# Patient Record
Sex: MALE | Race: WHITE | NOT HISPANIC OR LATINO | Employment: FULL TIME | URBAN - METROPOLITAN AREA
[De-identification: names, ages, dates, MRNs, and addresses within clinical notes are randomized per-mention and may not be internally consistent; named-entity substitution may affect disease eponyms.]

---

## 2017-01-02 ENCOUNTER — OFFICE VISIT (OUTPATIENT)
Dept: DERMATOLOGY | Facility: CLINIC | Age: 47
End: 2017-01-02
Payer: COMMERCIAL

## 2017-01-02 DIAGNOSIS — B35.1 ONYCHOMYCOSIS: Primary | ICD-10-CM

## 2017-01-02 DIAGNOSIS — L57.0 AK (ACTINIC KERATOSIS): ICD-10-CM

## 2017-01-02 DIAGNOSIS — Z85.820 HISTORY OF MELANOMA: ICD-10-CM

## 2017-01-02 LAB
ALBUMIN SERPL-MCNC: 4 G/DL (ref 3.4–5)
ALP SERPL-CCNC: 43 U/L (ref 40–150)
ALT SERPL W P-5'-P-CCNC: 29 U/L (ref 0–70)
AST SERPL W P-5'-P-CCNC: 16 U/L (ref 0–45)
BILIRUB DIRECT SERPL-MCNC: 0.1 MG/DL (ref 0–0.2)
BILIRUB SERPL-MCNC: 0.5 MG/DL (ref 0.2–1.3)
PROT SERPL-MCNC: 7 G/DL (ref 6.8–8.8)

## 2017-01-02 PROCEDURE — 99214 OFFICE O/P EST MOD 30 MIN: CPT | Mod: 25 | Performed by: DERMATOLOGY

## 2017-01-02 PROCEDURE — 36415 COLL VENOUS BLD VENIPUNCTURE: CPT | Performed by: DERMATOLOGY

## 2017-01-02 PROCEDURE — 80076 HEPATIC FUNCTION PANEL: CPT | Performed by: DERMATOLOGY

## 2017-01-02 PROCEDURE — 17000 DESTRUCT PREMALG LESION: CPT | Performed by: DERMATOLOGY

## 2017-01-02 RX ORDER — TERBINAFINE HYDROCHLORIDE 250 MG/1
250 TABLET ORAL DAILY
Qty: 30 TABLET | Refills: 2 | Status: SHIPPED | OUTPATIENT
Start: 2017-01-02 | End: 2017-04-02

## 2017-01-02 NOTE — MR AVS SNAPSHOT
After Visit Summary   1/2/2017    Wilner Clement    MRN: [id number]           Patient Information     Date Of Birth          1970        Visit Information        Provider Department      1/2/2017 2:00 PM Jessie Bower MD [facility]        Today's Diagnoses     Onychomycosis    -  1       Care Instructions    Cryotherapy    What is it?    Use of a very cold liquid, such as liquid nitrogen, to freeze and destroy abnormal skin cells that need to be removed    What should I expect?    Tenderness and redness    A small blister that might grow and fill with dark purple blood. There may be crusting.    More than one treatment may be needed if the lesions do not go away.    How do I care for the treated area?    Gently wash the area with your hands when bathing.    Use a thin layer of Vaseline to help with healing. You may use a Band-Aid.     The area should heal within 7-10 days and may leave behind a pink or lighter color.     Do not use an antibiotic or Neosporin ointment.     You may take acetaminophen (Tylenol) for pain.     Call your Doctor if you have:    Severe pain    Signs of infection (warmth, redness, cloudy yellow drainage, and or a bad smell)    Questions or concerns    Who should I call with questions?       [facility]: [phone]       [facility]: [phone]       For urgent needs outside of business hours call the [facility] at [phone]        and ask for the dermatology resident on call        Follow-ups after your visit        Your next 10 appointments already scheduled     Feb 15, 2017  4:00 PM   LAB with LAB FIRST FLOOR [facility] ([facility])    [address] [place] [id number] [phone]           Patient must bring picture ID.  Patient should be prepared to give a urine specimen  Please do not eat 10-12 hours before  your appointment if you are coming in fasting for labs on lipids, cholesterol, or glucose (sugar).  Pregnant women should follow their Care Team instructions. Water with medications is okay. Do not drink coffee or other fluids.   If you have concerns about taking  your medications, please ask at office or if scheduling via PaxVax, send a message by clicking on Secure Messaging, Message Your Care Team.              Future tests that were ordered for you today     Open Standing Orders        Priority Remaining Interval Expires Ordered    Hepatic panel Routine 2/2 2/13/2018 1/2/2017            Who to contact     If you have questions or need follow up information about today's clinic visit or your schedule please contact [facility] directly at [phone].  Normal or non-critical lab and imaging results will be communicated to you by Airuhart, letter or phone within 4 business days after the clinic has received the results. If you do not hear from us within 7 days, please contact the clinic through [email or web address] or phone. If you have a critical or abnormal lab result, we will notify you by phone as soon as possible.  Submit refill requests through PaxVax or call your pharmacy and they will forward the refill request to us. Please allow 3 business days for your refill to be completed.          Additional Information About Your Visit        PaxVax Information     PaxVax gives you secure access to your electronic health record. If you see a primary care provider, you can also send messages to your care team and make appointments. If you have questions, please call your primary care clinic.  If you do not have a primary care provider, please call [phone] and they will assist you.      PaxVax is an electronic gateway that provides easy, online access to your medical records. With PaxVax, you can request a clinic appointment, read your test results, renew a prescription or communicate with your care  team.     To access your existing account, please contact your [facility] or call [phone] for assistance.         Blood Pressure from Last 3 Encounters:   09/29/15 128/92   10/06/14 118/78   06/16/14 112/82    Weight from Last 3 Encounters:   09/29/15 113.399 kg (250 lb)   10/06/14 106.323 kg (234 lb 6.4 oz)   06/16/14 102.967 kg (227 lb)                 Today's Medication Changes          These changes are accurate as of: 1/2/17  2:30 PM.  If you have any questions, ask your nurse or doctor.               Start taking these medicines.        Dose/Directions    terbinafine 250 MG tablet   Commonly known as:  lamISIL   Used for:  Onychomycosis   Started by:  Jessie Bower MD        Dose:  250 mg   Take 1 tablet (250 mg) by mouth daily   Quantity:  30 tablet   Refills:  2            Where to get your medicines      These medications were sent to [place] [address] [facility] [place] - [address]  [address] [facility] [address]     Phone:  [phone]    - terbinafine 250 MG tablet             Primary Care Provider Office Phone # Fax #    [facility] [phone]       [facility] [address] [facility] [address]        Thank you!     Thank you for choosing [facility]  for your care. Our goal is always to provide you with excellent care. Hearing back from our patients is one way we can continue to improve our services. Please take a few minutes to complete the written survey that you may receive in the mail after your visit with us. Thank you!             Your Updated Medication List - Protect others around you: Learn how to safely use, store and throw away your medicines at www.disposemymeds.org.          This list is accurate as of: 1/2/17  2:30 PM.  Always use your most recent med list.                   Brand Name Dispense Instructions for use    terbinafine 250 MG tablet    lamISIL    30 tablet     Take 1 tablet (250 mg) by mouth daily

## 2017-01-02 NOTE — Clinical Note
Patient:  Wilner Clement  :   1970  MRN:     [id number]        Mr.Thomas BETH Clement  [address] [place] [address]        2017    Dear ,    We are writing to inform you of your test results that show normal liver function, it is ok for you to take the terbinafine medication we discussed. If you have further questions or concerns, please contact the clinic at [phone].      Sincerely,    Jessie Bower MD    Dermatology Department of Dermatology  [facility]    Resulted Orders   Hepatic panel   Result Value Ref Range    Bilirubin Direct 0.1 0.0 - 0.2 mg/dL    Bilirubin Total 0.5 0.2 - 1.3 mg/dL    Albumin 4.0 3.4 - 5.0 g/dL    Protein Total 7.0 6.8 - 8.8 g/dL    Alkaline Phosphatase 43 40 - 150 U/L    ALT 29 0 - 70 U/L    AST 16 0 - 45 U/L

## 2017-01-02 NOTE — NURSING NOTE
Dermatology Rooming Note    Wilner Clement's goals for this visit include:   Chief Complaint   Patient presents with     Derm Problem     yrly skin check area of concern on left forearm       Is a scribe okay for this visit:Not applicable,     Are records needed for this visit(If yes, obtain release of information): No,      Vitals: There were no vitals taken for this visit.    Referring Provider:  ESTABLISHED PATIENT  No address on file    Bev Grullon LPN

## 2017-01-02 NOTE — PATIENT INSTRUCTIONS
Cryotherapy    What is it?    Use of a very cold liquid, such as liquid nitrogen, to freeze and destroy abnormal skin cells that need to be removed    What should I expect?    Tenderness and redness    A small blister that might grow and fill with dark purple blood. There may be crusting.    More than one treatment may be needed if the lesions do not go away.    How do I care for the treated area?    Gently wash the area with your hands when bathing.    Use a thin layer of Vaseline to help with healing. You may use a Band-Aid.     The area should heal within 7-10 days and may leave behind a pink or lighter color.     Do not use an antibiotic or Neosporin ointment.     You may take acetaminophen (Tylenol) for pain.     Call your Doctor if you have:    Severe pain    Signs of infection (warmth, redness, cloudy yellow drainage, and or a bad smell)    Questions or concerns    Who should I call with questions?       [facility]: [phone]       [facility]: [phone]       For urgent needs outside of business hours call the [facility] at [phone]        and ask for the dermatology resident on call

## 2017-01-02 NOTE — PROGRESS NOTES
Derm Prob List:  1. Melanoma: Melanoma in situ located on the Left Shoulder  s/p Excision on 2010  2. CNH, R ear  3. Dysplastic nevus (mild), L upper back    CHIEF COMPLAINT:   Chief Complaint   Patient presents with     Derm Problem     yrly skin check area of concern on left forearm       SUBJECTIVE: Wilner Clement a 44 year old male with a personal history of Melanoma in situ located on the Left Shoulder  s/p Excision on 2010 here for follow up skin exam.  They were last seen in our clinic 9/24/15. Today he has no concerns over growing, tender, bleeding, or other lesions. He is otherwise well and without other skin concerns.    PAST MEDICAL HISTORY:   Allergies as of    Diagnosis     Family history of diabetes mellitus     Family history of ischemic heart disease     Neoplasm of uncertain behavior of skin     CARDIOVASCULAR SCREENING; LDL GOAL LESS THAN 160     History of melanoma in situ     Family history of colon cancer     Cherry angioma     Multiple nevi     FAMILY HISTORY: No family history of non-melanoma skin cancer or melanoma.    SUN SCREEN USE: yes  TANNING BED USE: never  REVIEW OF SYSTEMS: No fevers, chills, night sweats or unexplained weight loss.  The patient is otherwise feeling well and has no other concerns.  MEDICATIONS:    No current outpatient prescriptions on file.     ALLERGIES:    Allergies as of 01/02/2017 - Review Complete 01/02/2017   Allergen Reaction Noted     No known drug allergies  09/27/2010     OBJECTIVE:   Heathy appearing 45 year old male, awake, alert and oriented, in no acute distress.   A full skin examination was performed including the face, scalp, ears, eyelids, lips, neck, chest, back, abdomen, hands, feet, upper and lower extremities and buttocks was performed and findings are as follows:  - gritty red papule on L cheek  - maceration of toe webls bilaterally and thickened discolored toenails with subungual debris  - cherry red papule to the top of the vertex scalp,  trunk, bilateral legs.  - Scattered brown macules on sun exposed areas and right forehead - 1cm.  - Scattered, small (<4mm), monomorphic, brown, symmetric macules and papules distributed over the trunk and extremities.    - Dermatofibroma- Firm slightly hyperpigmented nodule that invaginates with pinching 0.5 cm in size is located on the posterior left lower leg.  - Site of previous skin cancer is examined and no evidence of recurrence is noted by inspection or palpation.   - Lymph Nodes - Examination of the pre and post auricular, occipital, submental, cervical, clavicular, axillary and inguinal lymph nodes was performed with no palpable adenopathy    ASSESSMENT AND PLAN:     onychomycosis and tinea  Pedis - terbinafine x 3 mo    1 -melanoma in situ: no evidence of recurrence   - reviewed ABCDEs of melanoma   - Sun precaution was advised including the use of sun screens of SPF 30 or higher, with reapplication every 2 hours. Use hats and other physical protective barriers when possible.  2 - Benign nevi: no lesions of concern on dermoscopy, continue to review clinically  3 - Solar lentiginosis: Sun protection with SPF 30 or higher recommended  4 - tinea pedix and onychomycosis:  Start terbinafine 250 mg daily x12 weeks for nails. Patient counseled that nails may take approximately 12 to 18  monthsto completely grow out.   Repeat course of Lamisil may be necessary.The patient was counseled on the rare but serious risk of liver disease. Baseline alt and ast will be obtained today and repeated in 6 weeks.     5 - AK: 1 lesion treated today by cryotherapy, pt given post cryo wound care instructions  The patient will follow up 1 year, sooner if new or changing lesions.  Jessie Bower MD    Department of Dermatology  [facility]

## 2018-01-04 ENCOUNTER — OFFICE VISIT (OUTPATIENT)
Dept: DERMATOLOGY | Facility: CLINIC | Age: 48
End: 2018-01-04
Payer: COMMERCIAL

## 2018-01-04 DIAGNOSIS — D22.9 MULTIPLE BENIGN NEVI: ICD-10-CM

## 2018-01-04 DIAGNOSIS — D18.01 CHERRY ANGIOMA: ICD-10-CM

## 2018-01-04 DIAGNOSIS — D48.5 NEOPLASM OF UNCERTAIN BEHAVIOR OF SKIN: Primary | ICD-10-CM

## 2018-01-04 DIAGNOSIS — Z86.006 HISTORY OF MELANOMA IN SITU: ICD-10-CM

## 2018-01-04 PROCEDURE — 99213 OFFICE O/P EST LOW 20 MIN: CPT | Mod: 25 | Performed by: DERMATOLOGY

## 2018-01-04 PROCEDURE — 11100 HC BIOPSY SKIN/SUBQ/MUC MEM, SINGLE LESION: CPT | Performed by: DERMATOLOGY

## 2018-01-04 PROCEDURE — 88305 TISSUE EXAM BY PATHOLOGIST: CPT | Performed by: DERMATOLOGY

## 2018-01-04 PROCEDURE — 11101 HC BIOPSY SKIN/SUBQ/MUC MEM, EACH ADDTL LESION: CPT | Performed by: DERMATOLOGY

## 2018-01-04 NOTE — LETTER
1/4/2018       RE: Wilner Clement  [address] [place] [address]     Dear Colleague,    Thank you for referring your patient, Wilner Clement, to the [facility]. Please see a copy of my visit note below.    [facility] Dermatology Note    Dermatology Problem List:  1. Melanoma: Melanoma in situ located on the Left Shoulder  s/p Excision on 2010  2. CNH, R ear  3. Dysplastic nevus (mild), L upper back  4. NUB x 2  - R deltoid, r/o dysplastic nevus vs melanoma biopsied 1/4/18 - results pending  - R upper back, r/o dysplastic nevus vs melanoma biopsied 1/4/18 - results pending    Encounter Date: Jan 4, 2018    CC:   Chief Complaint   Patient presents with     Derm Problem     full skin check H/O Melanoma in situ- left shoulder   LOV skin check Dr. Hoff 1/02/17     History of Present Illness:  Mr. Wilner Clement is a 47 year old male who presents as a follow-up for skin check. The patient was last seen 1/2/17 for his last skin check, he had only one AK at that time treated with LN2. The patient has no particular skin concerns of his own today.     Does currently have a cold sore that is healing. They occur regularly, but infrequently. He used to take PO antivirals.     Otherwise well. He does wear a wide brim hat when outdoors and tries to remember to wear sunscreen.    Past Medical History:   Patient Active Problem List   Diagnosis     Family history of diabetes mellitus     Family history of ischemic heart disease     Neoplasm of uncertain behavior of skin     CARDIOVASCULAR SCREENING; LDL GOAL LESS THAN 160     History of melanoma in situ     Family history of colon cancer     Cherry angioma     Multiple nevi     Past Medical History:   Diagnosis Date     Cancer (H)     Skin     Cancer (H)     melanoma- back     Lumbago 2003     MEDICAL HISTORY OF - 2003    left knee pain     MEDICAL HISTORY OF - 2005,2014    colonoscopy- due  2019     Past Surgical History:   Procedure Laterality Date     BIOPSY  2011    Skin biopsy/mole on back removed.     BIOPSY      Skin cancer     COLONOSCOPY       COLONOSCOPY  2/24/2014    Procedure: COLONOSCOPY;  Colonoscopy, screening;  Surgeon: Alanis Piña MD;  Location: [facility] OR     NO HISTORY OF SURGERY         Social History:  The patient denies use of tanning beds.    Family History:  There is no family history of skin cancer.    Medications:  No current outpatient prescriptions on file.        Allergies   Allergen Reactions     No Known Drug Allergies        Review of Systems:  -Constitutional: The patient denies fatigue, fevers, chills, unintended weight loss, and night sweats.  -Skin: As above in HPI. No additional skin concerns.    Physical exam:  Vitals: There were no vitals taken for this visit.  GEN: This is a well developed, well-nourished male in no acute distress, in a pleasant mood.    SKIN: A full skin examination was performed including the face, scalp, ears, eyelids, lips, neck, chest, back, abdomen, hands, feet, upper and lower extremities and buttocks was performed and findings are as follows:    - cherry red vascular papules to the top on the trunk and extremities  - Scattered, small (<4mm), monomorphic, brown, symmetric macules and papules distributed over the trunk and extremities.    - Firm medium brown hyperpigmented nodule that invaginates with pinching on the right anterior thigh.  - Site of previous skin cancer is examined and no evidence of recurrence is noted by inspection or palpation.   - Right deltoid and right upper back both with small ~2-3mm dark brown macules, under dermoscopy both lesions have a dark central homogenous region that disperse to a more reticular network peripherally  - Lymph Nodes - Examination of the pre and post auricular, occipital, submental, cervical, clavicular, axillary lymph nodes was performed with no palpable  adenopathy    Impression/Plan:  1. Neoplasm of uncertain behavior on the right deltoid. The differential diagnosis includes dysplastic nevus vs melanoma.     Shave biopsy:  After discussion of benefits and risks including but not limited to bleeding/bruising, pain/swelling, infection, scar, incomplete removal, nerve damage/numbness, recurrence, and non-diagnostic biopsy, written consent, verbal consent and photographs were obtained. Time-out was performed. The area was cleaned with isopropyl alcohol.  was injected to obtain adequate anesthesia of the lesion on the right deltoid. 1ml of 1% lidocaine with 1:100,000 epinephrine was injected to obtain adequate anesthesia. A  shave biopsy was performed. Hemostasis was achieved with aluminium chloride. Vaseline and a sterile dressing were applied. The patient tolerated the procedure and no complications were noted. The patient was provided with verbal and written post care instructions.      Will call or message via NewsiT when results are available    2. Neoplasm of uncertain behavior on the right upper back. The differential diagnosis includes dysplastic nevus vs melanoma.     Shave biopsy:  After discussion of benefits and risks including but not limited to bleeding/bruising, pain/swelling, infection, scar, incomplete removal, nerve damage/numbness, recurrence, and non-diagnostic biopsy, written consent, verbal consent and photographs were obtained. Time-out was performed. The area was cleaned with isopropyl alcohol.  was injected to obtain adequate anesthesia of the lesion on the right upperback. 1ml of 1% lidocaine with 1:100,000 epinephrine was injected to obtain adequate anesthesia. A  shave biopsy was performed. Hemostasis was achieved with aluminium chloride. Vaseline and a sterile dressing were applied. The patient tolerated the procedure and no complications were noted. The patient was provided with verbal and written post care instructions.    Will call or  message via DigiSynd when results are available    3. History of melanoma in situ    No evidence of recurrent disease by inspection or palpation    ABCDs of melanoma were discussed and self skin checks were advised.     Sun precaution was advised including the use of sun screens of SPF 30 or higher, sun protective clothing, and avoidance of tanning beds.    4. Cherry angiomas    Reviewed benign etiology, no treatment indicated    5. Benign nevi    Reviewed benign etiology, no treatment indicated      Follow-up in 1 year, earlier for new or changing lesions. Also sooner pending biopsy results, 1 year if WNL      Dr. Coello staffed the patient.    Staff Involved:  Resident(Yuniel Velazquez)/Staff(as above)    I have seen, examined, and discussed the patient with Dr. Velazquez. I agree with her history, exam, assessment, and plan as above.   The key points being:  Biopsy dark macules on shoulder and R upper back. Slightly lower threshold to biopsy in patient w/ hx of Melanoma in situ.   - Will call patient with biopsy results. Further treatment as indicated.   Reassured about benign lesions.   Continue sun avoidance and protection.   RTC 1 year if biopsies benign.     Romulo Coello DO    Department of Dermatology  [facility]: Phone: [phone], Fax:[phone]  [facility]: Phone: [phone], Fax: [phone]    Called patient to review biopsy results but he was unavailable. Left a detailed message explaining that these were just mildly abnormal moles and they were fully removed in the biopsy, thus no further treatment is necessary, he should follow up in 1 year for a skin check, sooner if needed. He can call back with any questions.    Yuniel Velazquez MD  Dermatology Resident, PGY2         Again, thank you for allowing me to participate in the care of your patient.      Sincerely,    Romulo TRUJILLO  MD [place]

## 2018-01-04 NOTE — NURSING NOTE
Dermatology Rooming Note    Wilner Clement's goals for this visit include:   Chief Complaint   Patient presents with     Derm Problem     full skin check H/O Melanoma in situ- left shoulder   LOV skin check Dr. Hoff 1/02/17       Is a scribe okay for this visit:Not applicable,     Are records needed for this visit(If yes, obtain release of information): No,      Vitals: There were no vitals taken for this visit.    Referring Provider:  No referring provider defined for this encounter.    Bev Grullon LPN

## 2018-01-04 NOTE — PATIENT INSTRUCTIONS

## 2018-01-04 NOTE — PROGRESS NOTES
[facility] Dermatology Note    Dermatology Problem List:  1. Melanoma: Melanoma in situ located on the Left Shoulder  s/p Excision on 2010  2. CNH, R ear  3. Dysplastic nevus (mild), L upper back  4. NUB x 2  - R deltoid, r/o dysplastic nevus vs melanoma biopsied 1/4/18 - results pending  - R upper back, r/o dysplastic nevus vs melanoma biopsied 1/4/18 - results pending    Encounter Date: Jan 4, 2018    CC:   Chief Complaint   Patient presents with     Derm Problem     full skin check H/O Melanoma in situ- left shoulder   LOV skin check Dr. Hoff 1/02/17     History of Present Illness:  Mr. Wilner Clement is a 47 year old male who presents as a follow-up for skin check. The patient was last seen 1/2/17 for his last skin check, he had only one AK at that time treated with LN2. The patient has no particular skin concerns of his own today.     Does currently have a cold sore that is healing. They occur regularly, but infrequently. He used to take PO antivirals.     Otherwise well. He does wear a wide brim hat when outdoors and tries to remember to wear sunscreen.    Past Medical History:   Patient Active Problem List   Diagnosis     Family history of diabetes mellitus     Family history of ischemic heart disease     Neoplasm of uncertain behavior of skin     CARDIOVASCULAR SCREENING; LDL GOAL LESS THAN 160     History of melanoma in situ     Family history of colon cancer     Cherry angioma     Multiple nevi     Past Medical History:   Diagnosis Date     Cancer (H)     Skin     Cancer (H)     melanoma- back     Lumbago 2003     MEDICAL HISTORY OF - 2003    left knee pain     MEDICAL HISTORY OF - 2005,2014    colonoscopy- due 2019     Past Surgical History:   Procedure Laterality Date     BIOPSY  2011    Skin biopsy/mole on back removed.     BIOPSY      Skin cancer     COLONOSCOPY       COLONOSCOPY  2/24/2014    Procedure: COLONOSCOPY;  Colonoscopy, screening;  Surgeon: Alanis Piña MD;   Location: [facility] OR     NO HISTORY OF SURGERY         Social History:  The patient denies use of tanning beds.    Family History:  There is no family history of skin cancer.    Medications:  No current outpatient prescriptions on file.        Allergies   Allergen Reactions     No Known Drug Allergies        Review of Systems:  -Constitutional: The patient denies fatigue, fevers, chills, unintended weight loss, and night sweats.  -Skin: As above in HPI. No additional skin concerns.    Physical exam:  Vitals: There were no vitals taken for this visit.  GEN: This is a well developed, well-nourished male in no acute distress, in a pleasant mood.    SKIN: A full skin examination was performed including the face, scalp, ears, eyelids, lips, neck, chest, back, abdomen, hands, feet, upper and lower extremities and buttocks was performed and findings are as follows:    - cherry red vascular papules to the top on the trunk and extremities  - Scattered, small (<4mm), monomorphic, brown, symmetric macules and papules distributed over the trunk and extremities.    - Firm medium brown hyperpigmented nodule that invaginates with pinching on the right anterior thigh.  - Site of previous skin cancer is examined and no evidence of recurrence is noted by inspection or palpation.   - Right deltoid and right upper back both with small ~2-3mm dark brown macules, under dermoscopy both lesions have a dark central homogenous region that disperse to a more reticular network peripherally  - Lymph Nodes - Examination of the pre and post auricular, occipital, submental, cervical, clavicular, axillary lymph nodes was performed with no palpable adenopathy    Impression/Plan:  1. Neoplasm of uncertain behavior on the right deltoid. The differential diagnosis includes dysplastic nevus vs melanoma.     Shave biopsy:  After discussion of benefits and risks including but not limited to bleeding/bruising, pain/swelling, infection, scar, incomplete removal,  nerve damage/numbness, recurrence, and non-diagnostic biopsy, written consent, verbal consent and photographs were obtained. Time-out was performed. The area was cleaned with isopropyl alcohol.  was injected to obtain adequate anesthesia of the lesion on the right deltoid. 1ml of 1% lidocaine with 1:100,000 epinephrine was injected to obtain adequate anesthesia. A  shave biopsy was performed. Hemostasis was achieved with aluminium chloride. Vaseline and a sterile dressing were applied. The patient tolerated the procedure and no complications were noted. The patient was provided with verbal and written post care instructions.      Will call or message via Dibsie when results are available    2. Neoplasm of uncertain behavior on the right upper back. The differential diagnosis includes dysplastic nevus vs melanoma.     Shave biopsy:  After discussion of benefits and risks including but not limited to bleeding/bruising, pain/swelling, infection, scar, incomplete removal, nerve damage/numbness, recurrence, and non-diagnostic biopsy, written consent, verbal consent and photographs were obtained. Time-out was performed. The area was cleaned with isopropyl alcohol.  was injected to obtain adequate anesthesia of the lesion on the right upperback. 1ml of 1% lidocaine with 1:100,000 epinephrine was injected to obtain adequate anesthesia. A  shave biopsy was performed. Hemostasis was achieved with aluminium chloride. Vaseline and a sterile dressing were applied. The patient tolerated the procedure and no complications were noted. The patient was provided with verbal and written post care instructions.    Will call or message via Dibsie when results are available    3. History of melanoma in situ    No evidence of recurrent disease by inspection or palpation    ABCDs of melanoma were discussed and self skin checks were advised.     Sun precaution was advised including the use of sun screens of SPF 30 or higher, sun protective  clothing, and avoidance of tanning beds.    4. Cherry angiomas    Reviewed benign etiology, no treatment indicated    5. Benign nevi    Reviewed benign etiology, no treatment indicated      Follow-up in 1 year, earlier for new or changing lesions. Also sooner pending biopsy results, 1 year if WNL      Dr. Coello staffed the patient.    Staff Involved:  Resident(Yuniel Velazquez)/Staff(as above)    I have seen, examined, and discussed the patient with Dr. Velazquez. I agree with her history, exam, assessment, and plan as above.   The key points being:  Biopsy dark macules on shoulder and R upper back. Slightly lower threshold to biopsy in patient w/ hx of Melanoma in situ.   - Will call patient with biopsy results. Further treatment as indicated.   Reassured about benign lesions.   Continue sun avoidance and protection.   RTC 1 year if biopsies benign.     Romulo Coello DO    Department of Dermatology  [facility]: Phone: [phone], Fax:[phone]  [facility]: Phone: [phone], Fax: [phone]

## 2018-01-10 LAB — COPATH REPORT: NORMAL

## 2018-01-11 ENCOUNTER — TELEPHONE (OUTPATIENT)
Dept: SURGERY | Facility: CLINIC | Age: 48
End: 2018-01-11

## 2018-01-11 NOTE — PROGRESS NOTES
Called patient to review biopsy results but he was unavailable. Left a detailed message explaining that these were just mildly abnormal moles and they were fully removed in the biopsy, thus no further treatment is necessary, he should follow up in 1 year for a skin check, sooner if needed. He can call back with any questions.    Yuniel Velazquez MD  Dermatology Resident, PGY2

## 2018-01-11 NOTE — TELEPHONE ENCOUNTER
Notes Recorded by Jenna Mckeon CMA on 1/11/2018 at 8:23 AM  Patient send a mychart in regards to results.  They were released before we were able to call him.  I sent him a response with the results.  See [employer] message from 1.10.18.    Jenna Mckeon CMA    ------    Notes Recorded by Romulo Coello MD on 1/10/2018 at 5:53 PM  Please call the patient to let him know the biopsies showed moles with mild atypia. They appeared to be removed with the biopsies, so nothing else needs to be done right now. He should plan to follow up in 1 year for skin cancer screening. If these sites seem to grow back or re-pigment, we should see him sooner. Thank you.

## 2018-04-06 ENCOUNTER — OFFICE VISIT (OUTPATIENT)
Dept: FAMILY MEDICINE | Facility: CLINIC | Age: 48
End: 2018-04-06
Payer: COMMERCIAL

## 2018-04-06 VITALS
BODY MASS INDEX: 34.39 KG/M2 | TEMPERATURE: 97.7 F | RESPIRATION RATE: 16 BRPM | WEIGHT: 259.5 LBS | DIASTOLIC BLOOD PRESSURE: 78 MMHG | OXYGEN SATURATION: 99 % | HEART RATE: 75 BPM | SYSTOLIC BLOOD PRESSURE: 122 MMHG | HEIGHT: 73 IN

## 2018-04-06 DIAGNOSIS — K59.00 CONSTIPATION, UNSPECIFIED CONSTIPATION TYPE: Primary | ICD-10-CM

## 2018-04-06 DIAGNOSIS — K64.4 EXTERNAL HEMORRHOIDS: ICD-10-CM

## 2018-04-06 PROCEDURE — 99214 OFFICE O/P EST MOD 30 MIN: CPT | Performed by: PHYSICIAN ASSISTANT

## 2018-04-06 RX ORDER — POLYETHYLENE GLYCOL 3350 17 G/17G
1 POWDER, FOR SOLUTION ORAL DAILY
Qty: 510 G | Refills: 1 | Status: SHIPPED | OUTPATIENT
Start: 2018-04-06 | End: 2018-05-14

## 2018-04-06 ASSESSMENT — PAIN SCALES - GENERAL: PAINLEVEL: NO PAIN (0)

## 2018-04-06 NOTE — MR AVS SNAPSHOT
After Visit Summary   4/6/2018    Wilner Clement    MRN: [id number]           Patient Information     Date Of Birth          1970        Visit Information        Provider Department      4/6/2018 2:40 PM Megan Guthrie PA-C [facility]        Today's Diagnoses     Constipation, unspecified constipation type    -  1    External hemorrhoids          Care Instructions    Hydrocortisone topically twice daily     Miralax 1 cap daily (may need to go up or down) to target consistency    Increase fruits- peaches/pears/prunes/grapes  Increase water                Follow-ups after your visit        Who to contact     If you have questions or need follow up information about today's clinic visit or your schedule please contact [facility] directly at [phone].  Normal or non-critical lab and imaging results will be communicated to you by [employer], letter or phone within 4 business days after the clinic has received the results. If you do not hear from us within 7 days, please contact the clinic through [employer] or phone. If you have a critical or abnormal lab result, we will notify you by phone as soon as possible.  Submit refill requests through [email or web address] or call your pharmacy and they will forward the refill request to us. Please allow 3 business days for your refill to be completed.          Additional Information About Your Visit        MyChart Information     [email or web address] gives you secure access to your electronic health record. If you see a primary care provider, you can also send messages to your care team and make appointments. If you have questions, please call your primary care clinic.  If you do not have a primary care provider, please call [phone] and they will assist you.        Care EveryWhere ID     This is your Care EveryWhere ID. This could be used by other organizations to access your [facility] medical records  [id number]        Your Vitals Were     Pulse  "Temperature Respirations Height Pulse Oximetry BMI (Body Mass Index)    75 97.7  F (36.5  C) (Temporal) 16 6' 1\" (1.854 m) 99% 34.24 kg/m2       Blood Pressure from Last 3 Encounters:   04/06/18 122/78   09/29/15 (!) 128/92   10/06/14 118/78    Weight from Last 3 Encounters:   04/06/18 259 lb 8 oz (117.7 kg)   09/29/15 250 lb (113.4 kg)   10/06/14 234 lb 6.4 oz (106.3 kg)              Today, you had the following     No orders found for display         Today's Medication Changes          These changes are accurate as of 4/6/18  3:10 PM.  If you have any questions, ask your nurse or doctor.               Start taking these medicines.        Dose/Directions    hydrocortisone 2.5 % cream   Commonly known as:  ANUSOL-HC   Used for:  External hemorrhoids   Started by:  Megan Guthrie PA-C        Place rectally 2 times daily   Quantity:  30 g   Refills:  0       polyethylene glycol powder   Commonly known as:  MIRALAX   Used for:  Constipation, unspecified constipation type   Started by:  Megan Guthrie PA-C        Dose:  1 capful   Take 17 g (1 capful) by mouth daily   Quantity:  510 g   Refills:  1            Where to get your medicines      These medications were sent to [facility] [place] [facility] - [place], [place] - [address]  [address], [place] [address]     Phone:  [phone]     hydrocortisone 2.5 % cream    polyethylene glycol powder                Primary Care Provider Office Phone # Fax #    [facility] [phone]       [address] [place] [address]        Equal Access to Services     [facility] Hospital[facility]IS[facility]DRO AH: Hadii christel patton Sotommy, waaxda luqadaha, qaybta kaalmada adeandrewyada, nisa cruz. So [facility] [phone].    ATENCIÓN: Si habla español, tiene a pryor disposición servicios gratuitos de asistencia lingüística. Llame al [phone].    We comply with applicable federal civil rights laws and [place] laws. We do not " discriminate on the basis of race, color, national origin, age, disability, sex, sexual orientation, or gender identity.            Thank you!     Thank you for choosing [facility]  for your care. Our goal is always to provide you with excellent care. Hearing back from our patients is one way we can continue to improve our services. Please take a few minutes to complete the written survey that you may receive in the mail after your visit with us. Thank you!             Your Updated Medication List - Protect others around you: Learn how to safely use, store and throw away your medicines at www.disposemymeds.org.          This list is accurate as of 4/6/18  3:10 PM.  Always use your most recent med list.                   Brand Name Dispense Instructions for use Diagnosis    hydrocortisone 2.5 % cream    ANUSOL-HC    30 g    Place rectally 2 times daily    External hemorrhoids       polyethylene glycol powder    MIRALAX    510 g    Take 17 g (1 capful) by mouth daily    Constipation, unspecified constipation type

## 2018-04-06 NOTE — PATIENT INSTRUCTIONS
Hydrocortisone topically twice daily     Miralax 1 cap daily (may need to go up or down) to target consistency    Increase fruits- peaches/pears/prunes/grapes  Increase water

## 2018-04-06 NOTE — PROGRESS NOTES
"  SUBJECTIVE:   Wilner Clement is a 47 year old male who presents to clinic today for the following health issues:      History of Present Illness     Diet:  Regular (no restrictions)  Frequency of exercise:  2-3 days/week  Duration of exercise:  30-45 minutes  Taking medications regularly:  Yes  Medication side effects:  Not applicable  Additional concerns today:  No    Hemorrhoids  Onset: 3 weeks   Usually BM in the morning daily.   Had weekend of constipation around 03/17/18. Started after that as pain at the rectum.   Sx worse in the am about 1 hr after the BM then gradually better and less pain later in day.   No bleeding with these sx.   No tarry stools.   No abd pain.  No urinary sx.   Has tried stool softener (not sure ingredient)- doesn't think stools are actually softer with this.   Fiber- not helped.   Has been using a 5% lidocaine.   water intake- \"try to\"  Colonoscopy - 02/24/2014- internal hemorrhoids, otherwise normal- repeat in 5yr due to fam hx colon cancer.       Description:   Pain: YES   Itching: YES , bothersome     Accompanying Signs & Symptoms:  Blood streaked toilet paper: no   Blood in stool: no   Changes in stool pattern: no     History:   Any previous GI studies done:Colonoscopy   Family history of colon cancer - father   Family History of colon cancer: YES    Precipitating factors:   None    Alleviating factors:  None    Therapies Tried and outcome: Patient tried topical cream ,  No relief.   Problem list and histories reviewed & adjusted, as indicated.  Additional history: as documented      Patient Active Problem List   Diagnosis     Family history of diabetes mellitus     Family history of ischemic heart disease     Neoplasm of uncertain behavior of skin     CARDIOVASCULAR SCREENING; LDL GOAL LESS THAN 160     History of melanoma in situ     Family history of colon cancer     Cherry angioma     Multiple nevi     Past Surgical History:   Procedure Laterality Date     BIOPSY  2011    " Skin biopsy/mole on back removed.     BIOPSY      Skin cancer     COLONOSCOPY       COLONOSCOPY  2/24/2014    Procedure: COLONOSCOPY;  Colonoscopy, screening;  Surgeon: Alanis Piña MD;  Location: [facility] OR     NO HISTORY OF SURGERY         Social History   Substance Use Topics     Smoking status: Never Smoker     Smokeless tobacco: Never Used     Alcohol use Yes      Comment: occasional     Family History   Problem Relation Age of Onset     Cancer - colorectal Father      diag. age 56     Hypertension Mother      Genitourinary Problems Mother      kidney insufficiency     Breast Cancer Sister      DIABETES Brother      maternal aunt     Asthma No family hx of      C.A.D. No family hx of      CEREBROVASCULAR DISEASE No family hx of      Prostate Cancer No family hx of      Alcohol/Drug No family hx of      Allergies No family hx of      Alzheimer Disease No family hx of      Anesthesia Reaction No family hx of      Arthritis No family hx of      Blood Disease No family hx of      Cardiovascular No family hx of      Connective Tissue Disorder No family hx of      Congenital Anomalies No family hx of      Circulatory No family hx of      CANCER No family hx of      Depression No family hx of      GASTROINTESTINAL DISEASE No family hx of      Eye Disorder No family hx of      Endocrine Disease No family hx of      Genetic Disorder No family hx of      Gynecology No family hx of      HEART DISEASE No family hx of      Lipids No family hx of      Family History Negative No family hx of      Thyroid Disease No family hx of      Respiratory No family hx of      Psychotic Disorder No family hx of      OSTEOPOROSIS No family hx of      Obesity No family hx of      Musculoskeletal Disorder No family hx of      Neurologic Disorder No family hx of      Hearing Loss No family hx of          No current outpatient prescriptions on file.     Allergies   Allergen Reactions     No Known Drug Allergies      BP Readings from Last 3  "Encounters:   04/06/18 122/78   09/29/15 (!) 128/92   10/06/14 118/78    Wt Readings from Last 3 Encounters:   04/06/18 259 lb 8 oz (117.7 kg)   09/29/15 250 lb (113.4 kg)   10/06/14 234 lb 6.4 oz (106.3 kg)                  Labs reviewed in EPIC    ROS:  Constitutional, HEENT, cardiovascular, pulmonary, gi and gu systems are negative, except as otherwise noted.    OBJECTIVE:     /78  Pulse 75  Temp 97.7  F (36.5  C) (Temporal)  Resp 16  Ht 6' 1\" (1.854 m)  Wt 259 lb 8 oz (117.7 kg)  SpO2 99%  BMI 34.24 kg/m2  Body mass index is 34.24 kg/(m^2).  GENERAL: healthy, alert and no distress  EYES: Eyes grossly normal to inspection, PERRL and conjunctivae and sclerae normal  RESP: lungs clear to auscultation - no rales, rhonchi or wheezes  CV: regular rate and rhythm, normal S1 S2, no S3 or S4, no murmur, click or rub, no peripheral edema and peripheral pulses strong  ABDOMEN: soft, nontender, no hepatosplenomegaly, no masses and bowel sounds normal  RECTAL (male): small non-thombosed external hemorrhoids, no obvious fissure. Limited FEROZ due to pt factors- normal sphincter tone, no obvious rectal masses, very edge of prostate palpated, smooth  MS: no gross musculoskeletal defects noted, no edema      ASSESSMENT/PLAN:     1. Constipation, unspecified constipation type  miralax for a few weeks, titrate does to achieve soft consistency.   increase water and fiber containing foods in diet  - polyethylene glycol (MIRALAX) powder; Take 17 g (1 capful) by mouth daily  Dispense: 510 g; Refill: 1    2. External hemorrhoids  Treat as below  - hydrocortisone (ANUSOL-HC) 2.5 % cream; Place rectally 2 times daily  Dispense: 30 g; Refill: 0    Follow Up: For worsening or changing symptoms, non-improvement as expected/discussed, questions regarding your medications or treatment plan patient was instructed to contact the clinic. Discussed parameters for follow up and included in After Visit Summary given to " patient.      Megan Guthrie PA-C  [facility]

## 2018-05-13 ENCOUNTER — MYC REFILL (OUTPATIENT)
Dept: FAMILY MEDICINE | Facility: CLINIC | Age: 48
End: 2018-05-13

## 2018-05-13 DIAGNOSIS — Z12.11 SPECIAL SCREENING FOR MALIGNANT NEOPLASMS, COLON: ICD-10-CM

## 2018-05-13 DIAGNOSIS — K64.4 EXTERNAL HEMORRHOIDS: Primary | ICD-10-CM

## 2018-05-13 DIAGNOSIS — Z80.0 FAMILY HISTORY OF COLON CANCER: ICD-10-CM

## 2018-05-13 DIAGNOSIS — K59.00 CONSTIPATION, UNSPECIFIED CONSTIPATION TYPE: ICD-10-CM

## 2018-05-14 RX ORDER — POLYETHYLENE GLYCOL 3350 17 G/17G
1 POWDER, FOR SOLUTION ORAL DAILY
Qty: 510 G | Refills: 1 | OUTPATIENT
Start: 2018-05-14

## 2018-05-14 RX ORDER — POLYETHYLENE GLYCOL 3350 17 G/17G
1 POWDER, FOR SOLUTION ORAL DAILY
Qty: 510 G | Refills: 1 | Status: SHIPPED | OUTPATIENT
Start: 2018-05-14 | End: 2019-04-03

## 2018-05-14 NOTE — TELEPHONE ENCOUNTER
Message from MyChart:  Original authorizing provider: JC Ferris BETHBhavesh Clement would like a refill of the following medications:  hydrocortisone (ANUSOL-HC) 2.5 % cream [Megan Guthrie PA-C]  polyethylene glycol (MIRALAX) powder [Megan Guthrie PA-C]    Preferred pharmacy: [facility] [id number] [place] [address]    Comment:

## 2018-05-14 NOTE — TELEPHONE ENCOUNTER
"KP-Pt requesting refill of hydrocortisone cream    Wilner Clement is a 48 year old male who calls with constipation.    NURSING ASSESSMENT:  Description:  I spoke with pt who states he is still having issues with hemorrhoids. Some discomfort \"but able to control it with medicine\". No blood, no abdominal pain. Using the Miralax. States he has no issues with constipation when he remembers when to take the Miralax.   Onset/duration:  Last month  Precip. factors:  hemorrhoids  Associated symptoms:  See above  Improves/worsens symptoms:  Miralax helps with constipation and hydrocortisone helps with discomfort.  Pain scale (0-10)   noticeable     Allergies:   Allergies   Allergen Reactions     No Known Drug Allergies      NURSING PLAN: Routed to provider Yes    RECOMMENDED DISPOSITION:  Home care advice  Will comply with recommendation: Yes  If further questions/concerns or if symptoms do not improve, worsen or new symptoms develop, call your PCP or [facility] as soon as possible.      Guideline used: constipation  Telephone Triage Protocols for Nurses, Fifth Edition, Laura Hanna RN    "

## 2018-05-14 NOTE — TELEPHONE ENCOUNTER
Miralax    Sent 4/6/2018 with 2 month supply. Refill not appropriate at this time.       Hydrocortisone    Routing refill request to provider for review/approval because:  LOV does not state how long pt should use medication    Karol Hanna RN, BSN

## 2018-05-14 NOTE — TELEPHONE ENCOUNTER
Can we clarify- did pt actually request this or was this through the pharmacy as auto request? Is pt still having sx- if so triage- needs recheck? Megan Guthrie PA-C

## 2018-05-15 NOTE — TELEPHONE ENCOUNTER
"Treated sx with miralax for a month and constipation was better. Has been on new diet- low carb. Went 1 week without miralax and then had \"a bad BM this weekend\", and hemorrhoid sx returned. Had remaining hydrocortisone cream that helped to alleviate hemorrhoidal discomfort. No rectal pain but uncomfortable. At times has had constipation in past. More recently- attributes to diet changes.  No bleeding with BMs. No abd pain. No weight changes. Trying to lose weight- with  New diet- down 10 lbs.    Father colon cancer in 50s.   Pt last screening colonoscopy 02/2014-- advised 5 yr f/u (02/2019)  Refill medications. Discussed obtaining 5 yr screening early if the intermittent constipation is change beyond his previous sporadic pattern. Referral placed.   Megan Guthrie PA-C              "

## 2018-05-16 ENCOUNTER — TELEPHONE (OUTPATIENT)
Dept: FAMILY MEDICINE | Facility: CLINIC | Age: 48
End: 2018-05-16

## 2018-05-16 NOTE — TELEPHONE ENCOUNTER
Left message for patient to return call to schedule colonoscopy or EGD. If [facility] or [facility] are unavailable, please transfer to the surgery center.

## 2018-05-23 NOTE — TELEPHONE ENCOUNTER
Left message for patient to return call to schedule EGD/colonoscopy. If [facility] or [facility] are not available, please transfer to same day surgery        X3, letter sent

## 2019-03-04 ENCOUNTER — OFFICE VISIT (OUTPATIENT)
Dept: FAMILY MEDICINE | Facility: CLINIC | Age: 49
End: 2019-03-04
Payer: COMMERCIAL

## 2019-03-04 ENCOUNTER — TELEPHONE (OUTPATIENT)
Dept: FAMILY MEDICINE | Facility: CLINIC | Age: 49
End: 2019-03-04

## 2019-03-04 VITALS
HEIGHT: 74 IN | OXYGEN SATURATION: 97 % | DIASTOLIC BLOOD PRESSURE: 82 MMHG | TEMPERATURE: 98.3 F | WEIGHT: 241.3 LBS | HEART RATE: 86 BPM | BODY MASS INDEX: 30.97 KG/M2 | SYSTOLIC BLOOD PRESSURE: 110 MMHG | RESPIRATION RATE: 16 BRPM

## 2019-03-04 DIAGNOSIS — Z12.11 COLON CANCER SCREENING: ICD-10-CM

## 2019-03-04 DIAGNOSIS — R05.9 COUGH: Primary | ICD-10-CM

## 2019-03-04 PROCEDURE — 99214 OFFICE O/P EST MOD 30 MIN: CPT | Performed by: PHYSICIAN ASSISTANT

## 2019-03-04 RX ORDER — AZITHROMYCIN 250 MG/1
TABLET, FILM COATED ORAL
Qty: 6 TABLET | Refills: 0 | Status: SHIPPED | OUTPATIENT
Start: 2019-03-04 | End: 2019-04-03

## 2019-03-04 ASSESSMENT — MIFFLIN-ST. JEOR: SCORE: 2018.28

## 2019-03-04 ASSESSMENT — ENCOUNTER SYMPTOMS: COUGH: 1

## 2019-03-04 ASSESSMENT — PAIN SCALES - GENERAL: PAINLEVEL: NO PAIN (0)

## 2019-03-04 NOTE — PATIENT INSTRUCTIONS
Since you are having some gradual improvement, ok to monitor over next 2 days. If worsening or not improving, then start the antibiotic as discussed.     Viral respiratory infections are caused by viruses.   They do not improve with antibiotic treatment.   Symptoms tend to be most significant in the first 4-5 days, but may continue for 7-10 days.    Should be re-evaluated for new fevers, shortness of breath or difficulty breathing, painful breathing, chest pain, inability to swallow, other new or worsening symptoms.     Symptomatic treatments include:  Rest!   Stay well hydrated (water, broth soups, 7-up, gatorade, tea, etc). You should be able to empty your bladder every 4-6 hours.   Using a humidifer in the bedroom  Nasal saline sprays (ie little noses brand- OTC at pharmacy) for nasal congestion.  Vitamin C  Stay home, limit contact with others while you are ill, do not share food or beverages, and always use good handwashing.     For symptoms can try the following:   For body aches, headache, pain:   Tylenol (acetaminophen) up to 1000mg 3x/day.   Ibuprofen 600-800mg 3x/day (max dosing for adults is 12 over the counter tablets per 24 hrs).     For nasal congestion:   Saline nasal spray or flush   Vicks Vapor Rub   Nasal spray containing oxymetazoline (Afrin) - 1 sprays each nostril twice a day for 3 days only (prolonged use can worsen congestion symptoms once discontinued)    For cough:   Vicks Vapor Rub  Mucinex tablets (guaifenesin)- loosens chest mucus : 600mg extended release twice daily  Dextromethorphan containing cough syrup such as Delsym or Robitussin DM:  1 tsp twice daily  Honey may soothe your sore throat and help manage your cough- may take straight or in warm water with lemon juice.    For sore throat:   Gargle with salt water   Drink fluids. Things with honey and lemon can be soothing.   Throat lozenges as needed.   Over-the-counter chloraseptic spray    Return to Clinic  if symptoms not gradually  improving over the next week or if worsening.          Patient Education     What Is Acute Bronchitis?  Acute bronchitis is when the airways in your lungs (bronchial tubes) become red and swollen (inflamed). It is usually caused by a viral infection. But it can also occur because of a bacteria or allergen. Symptoms include a cough that produces yellow or greenish mucus and can last for days or sometimes weeks.  Inside healthy lungs    Air travels in and out of the lungs through the airways. The linings of these airways produce sticky mucus. This mucus traps particles that enter the lungs. Tiny structures called cilia then sweep the particles out of the airways.     Healthy airway: Airways are normally open. Air moves in and out easily.      Healthy cilia: Tiny, hairlike cilia sweep mucus and particles up and out of the airways.     Lungs with bronchitis  Bronchitis often occurs with a cold or the flu virus. The airways become inflamed (red and swollen). There is a deep hacking cough from the extra mucus. Other symptoms may include:    Wheezing    Chest discomfort    Shortness of breath    Mild fever  A second infection, this time due to bacteria, may then occur. And airways irritated by allergens or smoke are more likely to get infected.        Inflamed airway: Inflammation and extra mucus narrow the airway, causing shortness of breath.      Impaired cilia: Extra mucus impairs cilia, causing congestion and wheezing. Smoking makes the problem worse.     Making a diagnosis  A physical exam, health history, and certain tests help your healthcare provider make the diagnosis.  Health history  Your healthcare provider will ask you about your symptoms.  The exam  Your provider listens to your chest for signs of congestion. He or she may also check your ears, nose, and throat.  Possible tests    A sputum test for bacteria. This requires a sample of mucus from your lungs.    A nasal or throat swab. This tests to see if you  have a bacterial infection.    A chest X-ray. This is done if your healthcare provider thinks you have pneumonia.    Tests to check for an underlying condition. Other tests may be done to check for things such as allergies, asthma, or COPD (chronic obstructive pulmonary disease). You may need to see a specialist for more lung function testing.  Treating a cough  The main treatment for bronchitis is easing symptoms. Avoiding smoke, allergens, and other things that trigger coughing can often help. If the infection is bacterial, you may be given antibiotics. During the illness, it's important to get plenty of sleep. To ease symptoms:    Don t smoke. Also avoid secondhand smoke.    Use a humidifier. Or try breathing in steam from a hot shower. This may help loosen mucus.    Drink a lot of water and juice. They can soothe the throat and may help thin mucus.    Sit up or use extra pillows when in bed. This helps to lessen coughing and congestion.    Ask your provider about using medicine. Ask about using cough medicine, pain and fever medicine, or a decongestant.  Antibiotics  Most cases of bronchitis are caused by cold or flu viruses. They don t need antibiotics to treat them, even if your mucus is thick and green or yellow. Antibiotics don t treat viral illness and antibiotics have not been shown to have any benefit in cases of acute bronchitis. Taking antibiotics when they are not needed increases your risk of getting an infection later that is antibiotic-resistant. Antibiotics can also cause severe cases of diarrhea that require other antibiotics to treat.  It is important that you accept your healthcare provider's opinion to not use antibiotics. Your provider will prescribe antibiotics if the infection is caused by bacteria. If they are prescribed:    Take all of the medicine. Take the medicine until it is used up, even if symptoms have improved. If you don t, the bronchitis may come back.    Take the medicines as  directed. For instance, some medicines should be taken with food.    Ask about side effects. Ask your provider or pharmacist what side effects are common, and what to do about them.  Follow-up care  You should see your provider again in 2 to 3 weeks. By this time, symptoms should have improved. An infection that lasts longer may mean you have a more serious problem.  Prevention    Avoid tobacco smoke. If you smoke, quit. Stay away from smoky places. Ask friends and family not to smoke around you, or in your home or car.    Get checked for allergies.    Ask your provider about getting a yearly flu shot. Also ask about pneumococcal or pneumonia shots.    Wash your hands often. This helps reduce the chance of picking up viruses that cause colds and flu.  Call your healthcare provider if:    Symptoms worsen, or you have new symptoms    Breathing problems worsen or  become severe    Symptoms don t get better within a week, or within 3 days of taking antibiotics   Date Last Reviewed: 2/1/2017 2000-2018 [employer]. [address] [place] [address], [place] [address]. All rights reserved. This information is not intended as a substitute for professional medical care. Always follow your healthcare professional's instructions.

## 2019-03-04 NOTE — TELEPHONE ENCOUNTER
Appointment Request From: Wilner Clement      With Provider: MANE Best CNP [[facility]]      Preferred Date Range: 3/4/2019 - 3/4/2019      Preferred Times: Any time      Reason for visit: Request an Appointment      Comments:   Been battling  old like symptoms for a week. Not getting better.

## 2019-03-04 NOTE — PROGRESS NOTES
SUBJECTIVE:   Wilner Clement is a 48 year old male who presents to clinic today for the following health issues:      Cough     History of Present Illness     Diet:  Regular (no restrictions)  Frequency of exercise:  2-3 days/week  Duration of exercise:  15-30 minutes  Taking medications regularly:  Yes  Medication side effects:  None  Additional concerns today:  No    Acute Illness   Acute illness concerns: Cough  Onset: 1 week- 7 days.   Works outside, Tuesday night after working all day, felt congestion in chest. Stayed home from work Wednesday day, had fever that day.  Fever lasted 1 day and none since. Headaches, chest congestion, low energy. Cough started later. Cough is dry, sometimes gets mucus up.   Has been in bed/laying low. Rest, fluids.  No flu shot  Nonsmoker.   No lung disease/asthma. No diabetes.     Fever: YES, highest was 101.6 last wednesday    Chills/Sweats: YES    Headache (location?): YES    Sinus Pressure:no    Conjunctivitis:  no    Ear Pain: no    Rhinorrhea: no     Congestion: YES    Sore Throat: no      Cough: YES    Wheeze: no     Decreased Appetite: no     Nausea: no     Vomiting: no     Diarrhea:  no     Dysuria/Freq.: no     Fatigue/Achiness: YES    Sick/Strep Exposure: no     No GI sx.   Has cold sores- he gets with illnesses.   No travel.      Therapies Tried and outcome: Tylenol/Ibuprofen    Problem list and histories reviewed & adjusted, as indicated.  Additional history: as documented    Patient Active Problem List   Diagnosis     Family history of diabetes mellitus     Family history of ischemic heart disease     Neoplasm of uncertain behavior of skin     CARDIOVASCULAR SCREENING; LDL GOAL LESS THAN 160     History of melanoma in situ     Family history of colon cancer     Cherry angioma     Multiple nevi     Past Surgical History:   Procedure Laterality Date     BIOPSY  2011    Skin biopsy/mole on back removed.     BIOPSY      Skin cancer     COLONOSCOPY       COLONOSCOPY   2/24/2014    Procedure: COLONOSCOPY;  Colonoscopy, screening;  Surgeon: Alanis Piña MD;  Location: [facility] OR     NO HISTORY OF SURGERY         Social History     Tobacco Use     Smoking status: Never Smoker     Smokeless tobacco: Never Used   Substance Use Topics     Alcohol use: Yes     Comment: occasional     Family History   Problem Relation Age of Onset     Cancer - colorectal Father         diag. age 56     Hypertension Mother      Genitourinary Problems Mother         kidney insufficiency     Breast Cancer Sister      Diabetes Brother         maternal aunt     Asthma No family hx of      C.A.D. No family hx of      Cerebrovascular Disease No family hx of      Prostate Cancer No family hx of      Alcohol/Drug No family hx of      Allergies No family hx of      Alzheimer Disease No family hx of      Anesthesia Reaction No family hx of      Arthritis No family hx of      Blood Disease No family hx of      Cardiovascular No family hx of      Connective Tissue Disorder No family hx of      Congenital Anomalies No family hx of      Circulatory No family hx of      Cancer No family hx of      Depression No family hx of      Gastrointestinal Disease No family hx of      Eye Disorder No family hx of      Endocrine Disease No family hx of      Genetic Disorder No family hx of      Gynecology No family hx of      Heart Disease No family hx of      Lipids No family hx of      Family History Negative No family hx of      Thyroid Disease No family hx of      Respiratory No family hx of      Psychotic Disorder No family hx of      Osteoporosis No family hx of      Obesity No family hx of      Musculoskeletal Disorder No family hx of      Neurologic Disorder No family hx of      Hearing Loss No family hx of          Current Outpatient Medications   Medication Sig Dispense Refill     hydrocortisone (ANUSOL-HC) 2.5 % cream Place rectally 2 times daily (Patient not taking: Reported on 3/4/2019) 30 g 0     polyethylene glycol  "(MIRALAX) powder Take 17 g (1 capful) by mouth daily (Patient not taking: Reported on 3/4/2019) 510 g 1     Allergies   Allergen Reactions     No Known Drug Allergies      BP Readings from Last 3 Encounters:   03/04/19 110/82   04/06/18 122/78   09/29/15 (!) 128/92    Wt Readings from Last 3 Encounters:   03/04/19 109.5 kg (241 lb 4.8 oz)   04/06/18 117.7 kg (259 lb 8 oz)   09/29/15 113.4 kg (250 lb)                  Labs reviewed in EPIC    ROS:  Weight is down- has been working on weight loss- low carb, more exercise.   Bowels are more regular   Constitutional, HEENT, cardiovascular, pulmonary, gi and gu systems are negative, except as otherwise noted.    OBJECTIVE:     /82   Pulse 86   Temp 98.3  F (36.8  C) (Temporal)   Resp 16   Ht 1.87 m (6' 1.62\")   Wt 109.5 kg (241 lb 4.8 oz)   SpO2 97%   BMI 31.30 kg/m    Body mass index is 31.3 kg/m .  GENERAL:mildly ill, alert and no distress, nontoxic, no respiratory distress  EYES: Eyes grossly normal to inspection, PERRL and conjunctivae and sclerae normal  HENT: Normal cephalic/atraumatic. Sinuses nontender. Ear canals clear and TM's normal, no effusion. Nose mucosa no erythema and turbinates normal. Lips normal, no lesions. Buccal muscosa moist. Soft palate no lesions or ulcers. Tonsilar archs no erythema, tonsils normal, no exudates or erythema. Uvula midline. Posterior oropharynx normal erythema.   NECK: no adenopathy and no asymmetry, masses, or scars  RESP: infrequent cough, lungs clear to auscultation - no rales, rhonchi or wheezes  CV: regular rate and rhythm, normal S1 S2, no S3 or S4, no murmur, click or rub  MSK: no LE edema, calf tenderness, normal pedal pulses  SKIN: no suspicious lesions or rashes      Diagnostic Test Results:  none     ASSESSMENT/PLAN:     1. Cough  At this stage, still appears to be viral URI/bronchitis. Normal VS, normal exam.  Continue sx management. If worsening or not improving over next 2-3 day, then start abx as " below.   S/sx for recheck discussed- new fevers, SOB.   - azithromycin (ZITHROMAX) 250 MG tablet; Two tablets first day, then one tablet daily for four days.  Dispense: 6 tablet; Refill: 0    2. Colon cancer screening  Pt due for screening- 5 yr f/u.  Referral placed and pt was scheduled.   - GASTROENTEROLOGY ADULT REF PROCEDURE ONLY [facility] [phone]; No Provider Preference (Monday preferred)    Follow Up: The patient was instructed to contact clinic for worsening symptoms, non-improvement as expected/discussed, and for questions regarding medications or treatment plan. Discussed parameters for follow up and included in After Visit Summary given to patient.      Megan Guthrie PA-C  [facility]

## 2019-03-04 NOTE — TELEPHONE ENCOUNTER
Next 5 appointments (look out 90 days)    Mar 04, 2019  9:20 AM CST  Office Visit with Megan Guthrie PA-C  [facility] ([facility]) [address], [address]  [facility] [address]  [phone]        Karol Hanna, RN, BSN

## 2019-04-03 ASSESSMENT — MIFFLIN-ST. JEOR: SCORE: 2018.38

## 2019-04-06 ENCOUNTER — TELEPHONE (OUTPATIENT)
Dept: GASTROENTEROLOGY | Facility: CLINIC | Age: 49
End: 2019-04-06

## 2019-04-06 ENCOUNTER — NURSE TRIAGE (OUTPATIENT)
Dept: NURSING | Facility: CLINIC | Age: 49
End: 2019-04-06

## 2019-04-06 DIAGNOSIS — Z12.11 SPECIAL SCREENING FOR MALIGNANT NEOPLASMS, COLON: Primary | ICD-10-CM

## 2019-04-06 NOTE — TELEPHONE ENCOUNTER
Patient calling for GoLytely prescription for upcoming colonoscopy on Monday. States pharmacist will not accept script as his paper script is e-signature.     Electronically prescribed to patient's preferred pharmacy - [facility] in [place], [place].     Patient will call with issues.    Precious Grijalva MD  GI Fellow  [phone]

## 2019-04-06 NOTE — TELEPHONE ENCOUNTER
was paged by the page  to have them call the patient directly and prescribe something for prep. Precious Grijalva MD from the [facility] was paged.  I advised patient's wife to call back if they've not heard from the on call MD within 30 minutes.  Milagros Celestin RN-[facility]

## 2019-04-08 ENCOUNTER — SURGERY (OUTPATIENT)
Age: 49
End: 2019-04-08
Payer: COMMERCIAL

## 2019-04-08 ENCOUNTER — HOSPITAL ENCOUNTER (OUTPATIENT)
Facility: AMBULATORY SURGERY CENTER | Age: 49
Discharge: HOME | End: 2019-04-08
Attending: INTERNAL MEDICINE | Admitting: INTERNAL MEDICINE
Payer: COMMERCIAL

## 2019-04-08 VITALS
OXYGEN SATURATION: 95 % | HEIGHT: 74 IN | TEMPERATURE: 97.1 F | HEART RATE: 65 BPM | RESPIRATION RATE: 18 BRPM | SYSTOLIC BLOOD PRESSURE: 110 MMHG | WEIGHT: 240 LBS | DIASTOLIC BLOOD PRESSURE: 70 MMHG | BODY MASS INDEX: 30.8 KG/M2

## 2019-04-08 LAB — COLONOSCOPY: NORMAL

## 2019-04-08 PROCEDURE — G8907 PT DOC NO EVENTS ON DISCHARG: HCPCS

## 2019-04-08 PROCEDURE — G8918 PT W/O PREOP ORDER IV AB PRO: HCPCS

## 2019-04-08 PROCEDURE — 45378 DIAGNOSTIC COLONOSCOPY: CPT | Performed by: INTERNAL MEDICINE

## 2019-04-08 PROCEDURE — 45378 DIAGNOSTIC COLONOSCOPY: CPT

## 2019-04-08 RX ORDER — ONDANSETRON 2 MG/ML
4 INJECTION INTRAMUSCULAR; INTRAVENOUS
Status: DISCONTINUED | OUTPATIENT
Start: 2019-04-08 | End: 2019-04-09 | Stop reason: HOSPADM

## 2019-04-08 RX ORDER — LIDOCAINE 40 MG/G
CREAM TOPICAL
Status: DISCONTINUED | OUTPATIENT
Start: 2019-04-08 | End: 2019-04-09 | Stop reason: HOSPADM

## 2019-04-08 RX ORDER — FENTANYL CITRATE 50 UG/ML
INJECTION, SOLUTION INTRAMUSCULAR; INTRAVENOUS PRN
Status: DISCONTINUED | OUTPATIENT
Start: 2019-04-08 | End: 2019-04-08 | Stop reason: HOSPADM

## 2019-04-08 RX ADMIN — FENTANYL CITRATE 25 MCG: 50 INJECTION, SOLUTION INTRAMUSCULAR; INTRAVENOUS at 12:15

## 2019-04-08 RX ADMIN — FENTANYL CITRATE 25 MCG: 50 INJECTION, SOLUTION INTRAMUSCULAR; INTRAVENOUS at 12:09

## 2019-04-08 RX ADMIN — FENTANYL CITRATE 50 MCG: 50 INJECTION, SOLUTION INTRAMUSCULAR; INTRAVENOUS at 12:02

## 2019-04-08 RX ADMIN — FENTANYL CITRATE 50 MCG: 50 INJECTION, SOLUTION INTRAMUSCULAR; INTRAVENOUS at 12:06

## 2019-04-08 RX ADMIN — FENTANYL CITRATE 25 MCG: 50 INJECTION, SOLUTION INTRAMUSCULAR; INTRAVENOUS at 12:12

## 2019-11-05 ENCOUNTER — OFFICE VISIT (OUTPATIENT)
Dept: FAMILY MEDICINE | Facility: CLINIC | Age: 49
End: 2019-11-05
Payer: COMMERCIAL

## 2019-11-05 VITALS
DIASTOLIC BLOOD PRESSURE: 78 MMHG | SYSTOLIC BLOOD PRESSURE: 108 MMHG | RESPIRATION RATE: 16 BRPM | BODY MASS INDEX: 32.02 KG/M2 | WEIGHT: 249.5 LBS | OXYGEN SATURATION: 97 % | HEART RATE: 66 BPM | HEIGHT: 74 IN | TEMPERATURE: 98 F

## 2019-11-05 DIAGNOSIS — K12.2 UVULITIS: Primary | ICD-10-CM

## 2019-11-05 DIAGNOSIS — Z00.00 ROUTINE MEDICAL EXAM: ICD-10-CM

## 2019-11-05 PROCEDURE — 90471 IMMUNIZATION ADMIN: CPT | Performed by: NURSE PRACTITIONER

## 2019-11-05 PROCEDURE — G0103 PSA SCREENING: HCPCS | Performed by: NURSE PRACTITIONER

## 2019-11-05 PROCEDURE — 99213 OFFICE O/P EST LOW 20 MIN: CPT | Mod: 25 | Performed by: NURSE PRACTITIONER

## 2019-11-05 PROCEDURE — 87389 HIV-1 AG W/HIV-1&-2 AB AG IA: CPT | Performed by: NURSE PRACTITIONER

## 2019-11-05 PROCEDURE — 84443 ASSAY THYROID STIM HORMONE: CPT | Performed by: NURSE PRACTITIONER

## 2019-11-05 PROCEDURE — 80061 LIPID PANEL: CPT | Performed by: NURSE PRACTITIONER

## 2019-11-05 PROCEDURE — 82947 ASSAY GLUCOSE BLOOD QUANT: CPT | Performed by: NURSE PRACTITIONER

## 2019-11-05 PROCEDURE — 36415 COLL VENOUS BLD VENIPUNCTURE: CPT | Performed by: NURSE PRACTITIONER

## 2019-11-05 PROCEDURE — 90715 TDAP VACCINE 7 YRS/> IM: CPT | Performed by: NURSE PRACTITIONER

## 2019-11-05 RX ORDER — AMOXICILLIN 875 MG
875 TABLET ORAL 2 TIMES DAILY
Qty: 14 TABLET | Refills: 0 | Status: SHIPPED | OUTPATIENT
Start: 2019-11-05 | End: 2020-10-14

## 2019-11-05 ASSESSMENT — MIFFLIN-ST. JEOR: SCORE: 2056.47

## 2019-11-05 NOTE — PROGRESS NOTES
Subjective     Wilner Clement is a 49 year old male who presents to clinic today for the following health issues:    HPI   Acute Illness   Acute illness concerns: sore throat and cough  Onset: 1 week and a half      Fever: no     Chills/Sweats: no     Headache (location?): no     Sinus Pressure:no    Conjunctivitis:  no    Ear Pain: no    Rhinorrhea: no    Congestion: YES    Sore Throat: YES.- notes he has been snoring with this cold, but does not snore at baseline     Cough: Patient states cough has subsided now.     Wheeze: no    Decreased Appetite: no    Nausea: no    Vomiting: no    Diarrhea:  no    Dysuria/Freq.: no    Fatigue/Achiness: no    Sick/Strep Exposure: Everyone is sick at home.      Therapies Tried and outcome: Ibuprofen and Tylenol         Patient Active Problem List   Diagnosis     Family history of diabetes mellitus     Family history of ischemic heart disease     Neoplasm of uncertain behavior of skin     CARDIOVASCULAR SCREENING; LDL GOAL LESS THAN 160     History of melanoma in situ     Family history of colon cancer     Cherry angioma     Multiple nevi     Past Surgical History:   Procedure Laterality Date     BIOPSY  2011    Skin biopsy/mole on back removed.     BIOPSY      Skin cancer     COLONOSCOPY       COLONOSCOPY  2/24/2014    Procedure: COLONOSCOPY;  Colonoscopy, screening;  Surgeon: Alanis Piña MD;  Location: [facility] OR     COLONOSCOPY WITH CO2 INSUFFLATION N/A 4/8/2019    Procedure: COLONOSCOPY WITH CO2 INSUFFLATION;  Surgeon: Bj Stephens MD;  Location: [facility] OR     NO HISTORY OF SURGERY         Social History     Tobacco Use     Smoking status: Never Smoker     Smokeless tobacco: Never Used   Substance Use Topics     Alcohol use: Yes     Comment: 2-3 drinks per week      Family History   Problem Relation Age of Onset     Cancer - colorectal Father         diag. age 56     Hypertension Mother      Genitourinary Problems Mother         kidney insufficiency     Breast  Cancer Sister      Diabetes Brother         maternal aunt     Asthma No family hx of      C.A.D. No family hx of      Cerebrovascular Disease No family hx of      Prostate Cancer No family hx of      Alcohol/Drug No family hx of      Allergies No family hx of      Alzheimer Disease No family hx of      Anesthesia Reaction No family hx of      Arthritis No family hx of      Blood Disease No family hx of      Cardiovascular No family hx of      Connective Tissue Disorder No family hx of      Congenital Anomalies No family hx of      Circulatory No family hx of      Cancer No family hx of      Depression No family hx of      Gastrointestinal Disease No family hx of      Eye Disorder No family hx of      Endocrine Disease No family hx of      Genetic Disorder No family hx of      Gynecology No family hx of      Heart Disease No family hx of      Lipids No family hx of      Family History Negative No family hx of      Thyroid Disease No family hx of      Respiratory No family hx of      Psychotic Disorder No family hx of      Osteoporosis No family hx of      Obesity No family hx of      Musculoskeletal Disorder No family hx of      Neurologic Disorder No family hx of      Hearing Loss No family hx of          Current Outpatient Medications   Medication Sig Dispense Refill     amoxicillin (AMOXIL) 875 MG tablet Take 1 tablet (875 mg) by mouth 2 times daily 14 tablet 0     Allergies   Allergen Reactions     No Known Drug Allergies      Recent Labs   Lab Test 11/05/19  1605 01/02/17  1436 03/10/14  1352   *  --  106   HDL 68  --   --    TRIG 80  --   --    ALT  --  29  --    TSH 1.24  --  0.84      BP Readings from Last 3 Encounters:   11/05/19 108/78   04/08/19 110/70   03/04/19 110/82    Wt Readings from Last 3 Encounters:   11/05/19 113.2 kg (249 lb 8 oz)   04/03/19 108.9 kg (240 lb)   03/04/19 109.5 kg (241 lb 4.8 oz)        Reviewed and updated as needed this visit by Provider  Tobacco  Allergies  Meds   "Problems  Med Hx  Surg Hx  Fam Hx         Review of Systems   ROS COMP: Constitutional, HEENT, cardiovascular, pulmonary, GI, , musculoskeletal, neuro, skin, endocrine and psych systems are negative, except as otherwise noted.    This document serves as a record of the services and decisions personally performed and made by Jaye Wagner CNP. It was created on his/her behalf by Lauren Marion, trained medical scribe. The creation of this document is based the provider's statements to the medical scribes.    Scribe Lauren Marion 3:52 PM, November 5, 2019      Objective    /78   Pulse 66   Temp 98  F (36.7  C) (Oral)   Resp 16   Ht 1.88 m (6' 2\")   Wt 113.2 kg (249 lb 8 oz)   SpO2 97%   BMI 32.03 kg/m    Body mass index is 32.03 kg/m .     Physical Exam   GENERAL: healthy, alert and no distress  HENT: ear canals and TM's normal, nose without ulcers or lesions, uvula is swollen x2 with moderate erythema.   NECK: no adenopathy, no asymmetry, masses, or scars and thyroid normal to palpation  RESP: lungs clear to auscultation - no rales, rhonchi or wheezes  CV: regular rate and rhythm, normal S1 S2, no S3 or S4, no murmur, click or rub, no peripheral edema and peripheral pulses strong    Diagnostic Test Results:  Labs reviewed in Epic  No results found for this or any previous visit (from the past 24 hour(s)).        Assessment & Plan       ICD-10-CM    1. Uvulitis K12.2 amoxicillin (AMOXIL) 875 MG tablet   2. Routine medical exam Z00.00 TDAP VACCINE (ADACEL)          ADMIN VACCINE, FIRST     TSH with free T4 reflex     Prostate spec antigen screen     Glucose     Lipid panel reflex to direct LDL Fasting     HIV Antigen Antibody Combo      Reviewed symptoms and etiology patient presents with today. Advised starting amoxicillin 875 mg; Rx provided. Reviewed directions, benefits, and side effects of medication with patient today. Reviewed home cares, avoiding acidic food and drink, and monitoring symptoms. " "    Updated routine screening lab work with one time HIV screening lab laced today; will notify with results.     TDap vaccination administered today. Patient declined influenza vaccination at this time.     Follow up with PCP in 1 year for annual physical exam with routine  or prn.   Follow up with PCP if symptoms do not improve or worsen or PRN       BMI:   Estimated body mass index is 32.03 kg/m  as calculated from the following:    Height as of this encounter: 1.88 m (6' 2\").    Weight as of this encounter: 113.2 kg (249 lb 8 oz).   Weight management plan: Discussed healthy diet and exercise guidelines     The information in this document, created by the medical scribe for me, accurately reflects the services I personally performed and the decisions made by me. I have reviewed and approved this document for accuracy prior to leaving the patient care area.  Jaye Wagner CNP  3:52 PM, 11/05/19    MANE Best [facility]    "

## 2019-11-06 LAB
CHOLEST SERPL-MCNC: 190 MG/DL
GLUCOSE SERPL-MCNC: 83 MG/DL (ref 70–99)
HDLC SERPL-MCNC: 68 MG/DL
HIV 1+2 AB+HIV1 P24 AG SERPL QL IA: NONREACTIVE
LDLC SERPL CALC-MCNC: 106 MG/DL
NONHDLC SERPL-MCNC: 122 MG/DL
PSA SERPL-ACNC: 0.74 UG/L (ref 0–4)
TRIGL SERPL-MCNC: 80 MG/DL
TSH SERPL DL<=0.005 MIU/L-ACNC: 1.24 MU/L (ref 0.4–4)

## 2019-11-09 ENCOUNTER — HEALTH MAINTENANCE LETTER (OUTPATIENT)
Age: 49
End: 2019-11-09

## 2020-09-15 ENCOUNTER — MYC MEDICAL ADVICE (OUTPATIENT)
Dept: DERMATOLOGY | Facility: CLINIC | Age: 50
End: 2020-09-15

## 2020-10-05 ENCOUNTER — OFFICE VISIT (OUTPATIENT)
Dept: DERMATOLOGY | Facility: CLINIC | Age: 50
End: 2020-10-05
Payer: COMMERCIAL

## 2020-10-05 DIAGNOSIS — L82.1 SEBORRHEIC KERATOSIS: ICD-10-CM

## 2020-10-05 DIAGNOSIS — L81.4 SOLAR LENTIGINOSIS: ICD-10-CM

## 2020-10-05 DIAGNOSIS — D48.5 NEOPLASM OF UNCERTAIN BEHAVIOR OF SKIN: Primary | ICD-10-CM

## 2020-10-05 PROCEDURE — 11103 TANGNTL BX SKIN EA SEP/ADDL: CPT | Performed by: DERMATOLOGY

## 2020-10-05 PROCEDURE — 99213 OFFICE O/P EST LOW 20 MIN: CPT | Mod: 25 | Performed by: DERMATOLOGY

## 2020-10-05 PROCEDURE — 88305 TISSUE EXAM BY PATHOLOGIST: CPT | Performed by: PATHOLOGY

## 2020-10-05 PROCEDURE — 11102 TANGNTL BX SKIN SINGLE LES: CPT | Performed by: DERMATOLOGY

## 2020-10-05 ASSESSMENT — PAIN SCALES - GENERAL: PAINLEVEL: NO PAIN (0)

## 2020-10-05 NOTE — LETTER
10/5/2020         RE: Wilner Clement  [id number] [facility] [address]        Dear Colleague,    Thank you for referring your patient, Wilner Clement, to the [facility]. Please see a copy of my visit note below.    [facility] Dermatology Note    Dermatology Problem List:  1. Melanoma: Melanoma in situ located on the Left Shoulder  s/p Excision on 2010  2. CNH, R ear  3. Dysplastic nevus (mild), L upper back  4. NUB x 2; s/p biopsy 10/5/20  - left conchal bowl   - midline upper back    CC:   Chief Complaint   Patient presents with     Derm Problem     Wilner is here today due to spot of concern in left ear     Encounter Date: Oct 5, 2020    History of Present Illness:  . Wilner Clement is a 50 year old male with history of melanoma who presents for evaluation and likely biopsy of a growth in his left ear.  The bump has been present for a couple months.  It is rough scaly and sometimes bleeds.     He has not noticed any other new or changing moles on his skin.  He denies fevers chills fatigue weight loss.    He denies any lymphadenopathy    Past Medical History:   Patient Active Problem List   Diagnosis     Family history of diabetes mellitus     Family history of ischemic heart disease     Neoplasm of uncertain behavior of skin     CARDIOVASCULAR SCREENING; LDL GOAL LESS THAN 160     History of melanoma in situ     Family history of colon cancer     Cherry angioma     Multiple nevi     Past Medical History:   Diagnosis Date     Cancer (H)     Skin     Cancer (H)     melanoma- back     Lumbago 2003     MEDICAL HISTORY OF - 2003    left knee pain     MEDICAL HISTORY OF - 2005,2014    colonoscopy- due 2019     Past Surgical History:   Procedure Laterality Date     BIOPSY  2011    Skin biopsy/mole on back removed.     BIOPSY      Skin cancer     COLONOSCOPY       COLONOSCOPY  2/24/2014    Procedure: COLONOSCOPY;  Colonoscopy, screening;  Surgeon: Wang  MD Alanis;  Location: [facility] OR     COLONOSCOPY WITH CO2 INSUFFLATION N/A 4/8/2019    Procedure: COLONOSCOPY WITH CO2 INSUFFLATION;  Surgeon: Bj Stephens MD;  Location: [facility] OR     NO HISTORY OF SURGERY         Social History:  Patient reports that he has never smoked. He has never used smokeless tobacco. He reports current alcohol use. He reports that he does not use drugs.    Family History:  Family History   Problem Relation Age of Onset     Cancer - colorectal Father         diag. age 56     Hypertension Mother      Genitourinary Problems Mother         kidney insufficiency     Breast Cancer Sister      Diabetes Brother         maternal aunt     Asthma No family hx of      C.A.D. No family hx of      Cerebrovascular Disease No family hx of      Prostate Cancer No family hx of      Alcohol/Drug No family hx of      Allergies No family hx of      Alzheimer Disease No family hx of      Anesthesia Reaction No family hx of      Arthritis No family hx of      Blood Disease No family hx of      Cardiovascular No family hx of      Connective Tissue Disorder No family hx of      Congenital Anomalies No family hx of      Circulatory No family hx of      Cancer No family hx of      Depression No family hx of      Gastrointestinal Disease No family hx of      Eye Disorder No family hx of      Endocrine Disease No family hx of      Genetic Disorder No family hx of      Gynecology No family hx of      Heart Disease No family hx of      Lipids No family hx of      Family History Negative No family hx of      Thyroid Disease No family hx of      Respiratory No family hx of      Psychotic Disorder No family hx of      Osteoporosis No family hx of      Obesity No family hx of      Musculoskeletal Disorder No family hx of      Neurologic Disorder No family hx of      Hearing Loss No family hx of        Medications:  No current outpatient medications on file.     Allergies   Allergen Reactions     No Known Drug  Allergies      Review of Systems:  -Constitutional: Otherwise feeling well today, in usual state of health.  -Skin: As above in HPI. No additional skin concerns.    Physical exam:  Vitals: There were no vitals taken for this visit.  GEN: This is a well developed, well-nourished male in no acute distress, in a pleasant mood.    SKIN: Total skin excluding the undergarment areas was performed. The exam included the head/face, neck, both arms, chest, back, abdomen, both legs, digits and/or nails.   -Fish skin type: II  -Left chondral bowl with 1 cm verrucous papule with superficial erosion and hemorrhagic crust  -Midline upper back multiple tan to brown well-defined macules        One 5 mm dark brown papule in the midline  -Several tan waxy stuck on appearing papules on the trunk and extremities  -Well-healed surgical scar on the left shoulder    -No palpable cervical axillary or inguinal lymphadenopathy    -No other lesions of concern on areas examined.     Labs:  N/A    Impression/Plan:  1. Neoplasm of uncertain behavior on the left conchal bowl. The differential diagnosis includes Wart, SCC, ISK.   - Shave biopsy:  After discussion of benefits and risks including but not limited to bleeding/bruising, pain/swelling, infection, scar, incomplete removal, nerve damage/numbness, recurrence, and non-diagnostic biopsy, written consent, verbal consent and photographs were obtained. Time-out was performed. The area was cleaned with isopropyl alcohol. 0.5ml of 1% lidocaine with 1:100,000 epinephrine was injected to obtain adequate anesthesia. A shave biopsy was performed. Hemostasis was achieved with aluminium chloride. Vaseline and a sterile dressing were applied. The patient tolerated the procedure and no complications were noted. The patient was provided with verbal and written post care instructions.  - Plan for Mohs if skin cancer is confirmed    2. Neoplasm of uncertain behavior on the midline upper back. The  differential diagnosis includes Lentigo, MIS, Junctional nevus.   - Shave biopsy:  After discussion of benefits and risks including but not limited to bleeding/bruising, pain/swelling, infection, scar, incomplete removal, nerve damage/numbness, recurrence, and non-diagnostic biopsy, written consent, verbal consent and photographs were obtained. Time-out was performed. The area was cleaned with isopropyl alcohol. 0.5ml of 1% lidocaine with 1:100,000 epinephrine was injected to obtain adequate anesthesia. A shave biopsy was performed. Hemostasis was achieved with aluminium chloride. Vaseline and a sterile dressing were applied. The patient tolerated the procedure and no complications were noted. The patient was provided with verbal and written post care instructions.      3. Solar lentigines    Though the lesions are not in themselves dangerous they are a marker for sun damage and a risk factor for future skin cancers.    Patient was instructed to continue sun avoidance and sun protection methods.       4.  Seborrheic keratosis    Benign nature reviewed patient reassured.     Follow-up pending biopsy results      Staff Involved:  Staff Only    I personally performed the procedures today.    Romulo Coello DO    Department of Dermatology  [facility]: Phone: [phone], Fax:[phone]  [facility]: Phone: [phone], Fax: [phone]          Again, thank you for allowing me to participate in the care of your patient.        Sincerely,        Romulo Coello MD

## 2020-10-05 NOTE — PATIENT INSTRUCTIONS

## 2020-10-05 NOTE — NURSING NOTE
Wilner BETH Urbano's goals for this visit include:   Chief Complaint   Patient presents with     Derm Problem     Wilner is here today due to spot of concern in left ear     He requests these members of his care team be copied on today's visit information:     PCP: Jaye Wagner    Referring Provider:  No referring provider defined for this encounter.    There were no vitals taken for this visit.    Do you need any medication refills at today's visit? No    Kristina Vu LPN

## 2020-10-05 NOTE — NURSING NOTE
The following medication was given:     MEDICATION:  Lidocaine with epinephrine 1% 1:[id number]  ROUTE: SQ  SITE: see procedure note  DOSE: 2cc  LOT #: -[id number]  : Zunilda  EXPIRATION DATE: 02/2021  NDC#: 5921-6635-14   Was there drug waste? 1cc  Multi-dose vial: Yes    Kristina Vu LPN  October 5, 2020

## 2020-10-09 LAB — COPATH REPORT: NORMAL

## 2020-10-14 ENCOUNTER — TELEPHONE (OUTPATIENT)
Dept: DERMATOLOGY | Facility: CLINIC | Age: 50
End: 2020-10-14

## 2020-10-14 NOTE — TELEPHONE ENCOUNTER
Results reviewed with Aubrey.  He verbalized understanding and agreed with the plan.  Mohs scheduled for 11/2/20.  Next skin exam scheduled for 10/2021.  PEBBLES Centeno, Romulo MCMAHON MD sent to [facility]             Please call the patient with pathology results.     The biopsy on his left ear did show an SCC. My treatment recommendation is Mohs.   The biopsy on his upper back was a mole with mild atypia, but appear removed with the biopsy. No additional treatment is necessary.       Thank you.    Associated Results    Dermatological path order and indications  Order: [id number]  Status:  Final result   Visible to patient:  Yes (MyChart) Dx:  Neoplasm of uncertain behavior of skin  Component 9d ago   Copath Report Patient Name: JULIO CESAR SILVA   MR#: [id number]   Specimen #: [id number]   Collected: 10/5/2020   Received: 10/6/2020   Reported: 10/9/2020 18:17   Ordering Phy(s): ROMULO GAVIN     For improved result formatting, select 'View Enhanced Report Format' under    Linked Documents section.     SPECIMEN(S):   A: Skin, left conchal bowl, shave   B: Skin, midline upper back, shave     FINAL DIAGNOSIS:   A. Skin, left conchal bowl, shave:   - Superficially invasive squamous cell carcinoma  (see description)     B. Skin, midline upper back, shave:   - Compound dysplastic nevus with mild atypia

## 2020-10-14 NOTE — TELEPHONE ENCOUNTER
[facility] Dermatologic Surgery Pre-Surgery Screening Note     Pre-screening Information:  Hx of Skin Cancer: Yes  Hx of Mohs Surgery: No  Transplant: No  Immunocompromised: No  Current Anticoagulant(s): None  Bleeding Disorder(s): No  Stent: No  Pacemaker: No  Defibrillator: No  Brain/Nerve Stimulator: No  Endocarditis/Rheumatic Fever Hx: No  Vascular graft: No  Prophylactic Antibiotic Needed: No  Congenital heart defect: No  Prosthetic Heart Valve: No  Lesion on Leg/Groin: No  Prosthetic Joint : No  Diabetic: No  HIV/AIDS: No  Hepatitis: No  Pregnant: No  Prior Problem with Local Anesthesia: No  Patient wears CPAP mask: No  Current Tobacco Use: No  Extended Care Facility: No  Occupation:    needed?: No  Do you have mobility issues?: No  Do you use any assistive devices/DME?: No  Do you have any issues with lying for long periods of time?: No      Medications/Allergies  Medications and allergies review with patient: Yes     No current outpatient medications on file.     Allergies   Allergen Reactions     No Known Drug Allergies        Pertinent Labs:  Last Creatine: No results found for: CR  Last INR: No results found for: INR    Other Questions:    Reminded patient to take any order prophylactic antibiotics 1 hour prior to appointment: No    If on a prescribed anticoagulant, advised patient to continue or check with prescribing provider: No    If on an OTC anticoagulant/supplement, advised patient to hold these medications 10-14 days prior to surgery and resume 48 hrs after surgery: No     Please be aware that this can be an all day procedure. Please bring your daily medications and food/cash. Encourage patient to eat prior to procedure(s). After care instructions were reviewed with patient.    Michelle Acosta RN

## 2020-10-14 NOTE — LETTER
"October 14, 2020        Wilner CAMPOS Urbano  [id number] [facility] [address]        Dear Wilner,    You have an upcoming appointment with Dr. Coello for Mohs surgery. It is scheduled for 11/2/20 at 7:30 AM. Please check-in 10 minutes prior to your appointment at check-in desk \"D\" on the 2nd floor. Our address is [address] [place] [address].  Please review these important reminders:    1) Expect to be here the majority of the morning.  The Mohs surgical procedure can be an extensive surgery requiring multiple stages. Each stage may take 1-2 hours.     2) You may eat breakfast. You may bring snacks or beverages with you.  3) Take all normal medications morning of surgery.    4) You will need a  to be with you if your surgical site is close to your eyes. You can get swelling/bruising immediately after surgery and will go home with a big bulky bandage that could obstruct your view of driving safely.     5) Wear comfortable clothing -- preferably a button down shirt or a loose fitted shirt (to avoid pulling over pressure bandage off when you get home). If your surgery site is on your leg, try wearing loose fitted pants. If your surgery site is on your arm, try wearing a short-sleeve shirt.     6) Bring reading material or other items to help pass time. We do have internet access available if you own a laptop, iPad, etc.    7) Women - do NOT wear make-up. We will be washing it off anyone needing surgery on the face     8) Do NOT stop blood thinning medication unless instructed to do so by your surgeon. This will include: Warfarin, Coumadin, Eliquis, Jantoven, Aspirin, Plavix and Pradaxa. If you are taking Warfarin or Coumadin, please have your INR blood test results sent to our office no more than 7 days prior to your surgery.     9) Tylenol is a good alternative to take for headaches and pain, and can be taken throughout your surgery and postoperative recovery.    If you need to reschedule or have any " questions or concerns, please call me at [phone].    Sincerely,      Michelle Acosta RN

## 2020-10-28 NOTE — PROGRESS NOTES
[facility] Dermatology Note    Dermatology Problem List:  1. Melanoma: Melanoma in situ located on the Left Shoulder  s/p Excision on 2010  2. CNH, R ear  3. Dysplastic nevus (mild), L upper back  4. NUB x 2; s/p biopsy 10/5/20  - left conchal bowl   - midline upper back    CC:   Chief Complaint   Patient presents with     Derm Problem     Wilner is here today due to spot of concern in left ear     Encounter Date: Oct 5, 2020    History of Present Illness:  Mr. Wilner Clement is a 50 year old male with history of melanoma who presents for evaluation and likely biopsy of a growth in his left ear.  The bump has been present for a couple months.  It is rough scaly and sometimes bleeds.     He has not noticed any other new or changing moles on his skin.  He denies fevers chills fatigue weight loss.    He denies any lymphadenopathy    Past Medical History:   Patient Active Problem List   Diagnosis     Family history of diabetes mellitus     Family history of ischemic heart disease     Neoplasm of uncertain behavior of skin     CARDIOVASCULAR SCREENING; LDL GOAL LESS THAN 160     History of melanoma in situ     Family history of colon cancer     Cherry angioma     Multiple nevi     Past Medical History:   Diagnosis Date     Cancer (H)     Skin     Cancer (H)     melanoma- back     Lumbago 2003     MEDICAL HISTORY OF - 2003    left knee pain     MEDICAL HISTORY OF - 2005,2014    colonoscopy- due 2019     Past Surgical History:   Procedure Laterality Date     BIOPSY  2011    Skin biopsy/mole on back removed.     BIOPSY      Skin cancer     COLONOSCOPY       COLONOSCOPY  2/24/2014    Procedure: COLONOSCOPY;  Colonoscopy, screening;  Surgeon: Alanis Piña MD;  Location:  OR     COLONOSCOPY WITH CO2 INSUFFLATION N/A 4/8/2019    Procedure: COLONOSCOPY WITH CO2 INSUFFLATION;  Surgeon: Bj Stephens MD;  Location:  OR     NO HISTORY OF SURGERY         Social History:  Patient reports  that he has never smoked. He has never used smokeless tobacco. He reports current alcohol use. He reports that he does not use drugs.    Family History:  Family History   Problem Relation Age of Onset     Cancer - colorectal Father         diag. age 56     Hypertension Mother      Genitourinary Problems Mother         kidney insufficiency     Breast Cancer Sister      Diabetes Brother         maternal aunt     Asthma No family hx of      C.A.D. No family hx of      Cerebrovascular Disease No family hx of      Prostate Cancer No family hx of      Alcohol/Drug No family hx of      Allergies No family hx of      Alzheimer Disease No family hx of      Anesthesia Reaction No family hx of      Arthritis No family hx of      Blood Disease No family hx of      Cardiovascular No family hx of      Connective Tissue Disorder No family hx of      Congenital Anomalies No family hx of      Circulatory No family hx of      Cancer No family hx of      Depression No family hx of      Gastrointestinal Disease No family hx of      Eye Disorder No family hx of      Endocrine Disease No family hx of      Genetic Disorder No family hx of      Gynecology No family hx of      Heart Disease No family hx of      Lipids No family hx of      Family History Negative No family hx of      Thyroid Disease No family hx of      Respiratory No family hx of      Psychotic Disorder No family hx of      Osteoporosis No family hx of      Obesity No family hx of      Musculoskeletal Disorder No family hx of      Neurologic Disorder No family hx of      Hearing Loss No family hx of        Medications:  No current outpatient medications on file.     Allergies   Allergen Reactions     No Known Drug Allergies      Review of Systems:  -Constitutional: Otherwise feeling well today, in usual state of health.  -Skin: As above in HPI. No additional skin concerns.    Physical exam:  Vitals: There were no vitals taken for this visit.  GEN: This is a well developed,  well-nourished male in no acute distress, in a pleasant mood.    SKIN: Total skin excluding the undergarment areas was performed. The exam included the head/face, neck, both arms, chest, back, abdomen, both legs, digits and/or nails.   -Fish skin type: II  -Left chondral bowl with 1 cm verrucous papule with superficial erosion and hemorrhagic crust  -Midline upper back multiple tan to brown well-defined macules        One 5 mm dark brown papule in the midline  -Several tan waxy stuck on appearing papules on the trunk and extremities  -Well-healed surgical scar on the left shoulder    -No palpable cervical axillary or inguinal lymphadenopathy    -No other lesions of concern on areas examined.     Labs:  N/A    Impression/Plan:  1. Neoplasm of uncertain behavior on the left conchal bowl. The differential diagnosis includes Wart, SCC, ISK.   - Shave biopsy:  After discussion of benefits and risks including but not limited to bleeding/bruising, pain/swelling, infection, scar, incomplete removal, nerve damage/numbness, recurrence, and non-diagnostic biopsy, written consent, verbal consent and photographs were obtained. Time-out was performed. The area was cleaned with isopropyl alcohol. 0.5ml of 1% lidocaine with 1:100,000 epinephrine was injected to obtain adequate anesthesia. A shave biopsy was performed. Hemostasis was achieved with aluminium chloride. Vaseline and a sterile dressing were applied. The patient tolerated the procedure and no complications were noted. The patient was provided with verbal and written post care instructions.  - Plan for Mohs if skin cancer is confirmed    2. Neoplasm of uncertain behavior on the midline upper back. The differential diagnosis includes Lentigo, MIS, Junctional nevus.   - Shave biopsy:  After discussion of benefits and risks including but not limited to bleeding/bruising, pain/swelling, infection, scar, incomplete removal, nerve damage/numbness, recurrence, and  non-diagnostic biopsy, written consent, verbal consent and photographs were obtained. Time-out was performed. The area was cleaned with isopropyl alcohol. 0.5ml of 1% lidocaine with 1:100,000 epinephrine was injected to obtain adequate anesthesia. A shave biopsy was performed. Hemostasis was achieved with aluminium chloride. Vaseline and a sterile dressing were applied. The patient tolerated the procedure and no complications were noted. The patient was provided with verbal and written post care instructions.      3. Solar lentigines    Though the lesions are not in themselves dangerous they are a marker for sun damage and a risk factor for future skin cancers.    Patient was instructed to continue sun avoidance and sun protection methods.       4.  Seborrheic keratosis    Benign nature reviewed patient reassured.     Follow-up pending biopsy results      Staff Involved:  Staff Only    I personally performed the procedures today.    Romulo Coello DO    Department of Dermatology  [facility]: Phone: [phone], Fax:[phone]  [facility]: Phone: [phone], Fax: [phone]

## 2020-11-02 ENCOUNTER — OFFICE VISIT (OUTPATIENT)
Dept: DERMATOLOGY | Facility: CLINIC | Age: 50
End: 2020-11-02
Payer: COMMERCIAL

## 2020-11-02 VITALS — SYSTOLIC BLOOD PRESSURE: 157 MMHG | DIASTOLIC BLOOD PRESSURE: 104 MMHG | HEART RATE: 76 BPM

## 2020-11-02 DIAGNOSIS — C44.229 SQUAMOUS CELL CARCINOMA OF SKIN OF LEFT EAR: Primary | ICD-10-CM

## 2020-11-02 PROCEDURE — 17311 MOHS 1 STAGE H/N/HF/G: CPT | Performed by: DERMATOLOGY

## 2020-11-02 RX ORDER — MUPIROCIN 20 MG/G
OINTMENT TOPICAL ONCE
Status: COMPLETED | OUTPATIENT
Start: 2020-11-02 | End: 2020-11-02

## 2020-11-02 RX ORDER — MUPIROCIN 20 MG/G
OINTMENT TOPICAL
Qty: 22 G | Refills: 0 | Status: SHIPPED | OUTPATIENT
Start: 2020-11-02 | End: 2022-07-27

## 2020-11-02 RX ADMIN — MUPIROCIN: 20 OINTMENT TOPICAL at 13:31

## 2020-11-02 ASSESSMENT — PAIN SCALES - GENERAL: PAINLEVEL: NO PAIN (0)

## 2020-11-02 NOTE — LETTER
11/2/2020         RE: Wilner Clement  [address] [facility] [address]        Dear Colleague,    Thank you for referring your patient, Wilner Clement, to the [facility]. Please see a copy of my visit note below.    [facility] Mohs Dermatologic Surgery Procedure Note      Date of Service:  Nov 2, 2020  Surgery: Mohs micrographic surgery    Case 1  Repair Type: secondary  Repair Size: 1.3x1.2 cm  Suture Material: N/A  Tumor Type: SCC - Squamous cell carcinoma  Location: Left conchal bowl  Derm-Path Accession #: [id number]  PreOp Size: 0.8x0.5 cm  PostOp Size: 1.3x1.2 cm  Mohs Accession #: [id number]  Level of Defect: cartilage      Procedure:  We discussed the principles of treatment and most likely complications including scarring, bleeding, infection, swelling, pain, crusting, nerve damage, large wound,  incomplete excision, wound dehiscence,  nerve damage, recurrence, and a second procedure may be recommended to obtain the best cosmetic or functional result.    Informed consent was obtained and the patient underwent the procedure as follows:  The patient was placed supine on the operating table.  The cancer was identified, outlined with a marker, and verified by the patient.  The entire surgical field was prepped with Hibiclens.  The surgical site was anesthetized using Lidocaine 1% with epi 1:100,000.      The area of clinically apparent tumor was debulked. The layer of tissue was then surgically excised using a #15 blade and was then transferred onto a specimen sheet maintaining the orientation of the specimen. Hemostasis was obtained using bipolar electrocoagulation. The wound site was then covered with a dressing while the tissue samples were processed for examination.    The excised tissue was transported to the [facility] histology laboratory maintaining the tissue orientation.  The tissue specimen was relaxed so that the entire surgical margin was in a a  single horizontal plane for sectioning and inked for precise mapping.  A precise reference map was drawn to reflect the sectioning of the specimen, colored inking of the margins, and orientation on the patient. The tissue was processed using horizontal sectioning of the base and continuous peripheral margins.  The histopathologic sections were reviewed in conjunction with the reference map.    Total blocks: 1    Total slides:  3    There were no cancer cells visualized on examination, therefore Mohs surgery was complete.    Repair: Second intention healing  The patient is status post Mohs micrographic surgery.  The surgical site was examined with attention to normal anatomic and functional relationships.  After consideration and discussion of multiple options with the patient, it was determined that healing by second intention was preferred by the patient as he hoped to avoid additional cutting and suturing. The patient verbalized understanding and agrees with this plan. It is also understood that should second intention healing be sub-optimal, additional procedures such as scar revision, steroid injection or dermabrasion may be recommended.     The open wound was cleaned with hibiclens, and a thick layer of ointment was applied.  A pressure dressing consisting of non-adherent gauze, gauze and hypafix was applied. Wound care was discussed with patient both orally and in writing. The patient stated understanding and agreement of the course of care. He was discharged from the surgical suite in good condition.      He will return for evaluation of his wound as needed.     I personally performed the procedures today.      Romulo Coello DO    Department of Dermatology  [facility]: Phone: [phone], Fax:[phone]  [facility]: Phone: [phone], Fax: [phone]    Care and Laboratory  Testing Performed at:  [facility]   Dermatology Clinic  [address] [place] [address]          Again, thank you for allowing me to participate in the care of your patient.        Sincerely,        Romulo Coello MD

## 2020-11-02 NOTE — NURSING NOTE
Wilner Clement's goals for this visit include:   Chief Complaint   Patient presents with     Derm Problem     Wilner is here today for MOHS on his left conchal bowl due to SCC       He requests these members of his care team be copied on today's visit information:     PCP: Jaye Wagner    Referring Provider:  No referring provider defined for this encounter.    BP (!) 157/104 (BP Location: Left arm, Patient Position: Sitting, Cuff Size: Adult Regular)   Pulse 76     Do you need any medication refills at today's visit? No    Kristina Vu LPN    .

## 2020-11-02 NOTE — PATIENT INSTRUCTIONS
"Post Operative Care for Granulating Site:  After your surgery, a pressure bandage will be placed over the area. This will help prevent bleeding. Please follow these instructions as they will help you to prevent complications as your wound heals.  For the First 24 hours After Surgery:  1. Leave the pressure bandage on and keep it dry. If it should come loose, you may retape it, but do not take it off.  2. Relax and take it easy. Do not do any vigorous exercise, heavy lifting, or bending forward. This could cause the wound to bleed.  3. Post-operative pain is usually mild. You may take plain or extra strength Tylenol every 4 hours as needed. Do not take any medicine that contains aspirin, ibuprofen or motrin.  Avoid alcohol and vitamin E as these may increase your tendency to bleed.  4. You may put an ice pack around the bandaged area for 20 minutes every 2-3 hours. This may help reduce swelling, bruising, and pain. Make sure the ice pack is waterproof so that the pressure bandage does not get wet.   5. You may see a small amount of drainage or blood on your pressure bandage. This is normal. However, if drainage or bleeding continues or saturates the bandage, you will need to apply firm pressure over the bandage with a washcloth for 15 minutes. If bleeding continues after applying pressure for 15 minutes, apply an ice pack to the bandaged area for 15 minutes. If bleeding still continues, go to the nearest emergency room.  24 Hours After Surgery  Carefully remove the bandage and start daily wound care and dressing changes. You may also now shower and get the wound wet. Use caution when washing the wound, be gentle.  Daily Wound Care:    Wash with mild soap and water every day until wound appears well-healed.  Rinse well and pat dry.    Apply Vaseline over site with a Q-tip and Re-apply a dressing until wound appears well-healed.  A well healed wound is \"pinked over\" with a shiny surface.  When area is \"pinked over\" it " is no longer necessary to apply Vaseline.      Call Us If:    You have pain that is not controlled with Tylenol.    You have signs or symptoms of an infection, such as: fever over 100 degrees F, redness, warmth, or foul-smelling or yellow/creamy drainage from the wound.    Who should I call with questions?    [facility]: [phone]     [facility]: [phone]    For urgent needs outside of business hours call the [facility] at [phone] and ask for the dermatology resident on call

## 2020-12-01 NOTE — PROGRESS NOTES
[facility] Dermatologic Surgery Procedure Note      Date of Service:  Nov 2, 2020  Surgery: Mohs micrographic surgery    Case 1  Repair Type: secondary  Repair Size: 1.3x1.2 cm  Suture Material: N/A  Tumor Type: SCC - Squamous cell carcinoma  Location: Left conchal bowl  Derm-Path Accession #: [id number]  PreOp Size: 0.8x0.5 cm  PostOp Size: 1.3x1.2 cm  Mohs Accession #: [id number]  Level of Defect: cartilage      Procedure:  We discussed the principles of treatment and most likely complications including scarring, bleeding, infection, swelling, pain, crusting, nerve damage, large wound,  incomplete excision, wound dehiscence,  nerve damage, recurrence, and a second procedure may be recommended to obtain the best cosmetic or functional result.    Informed consent was obtained and the patient underwent the procedure as follows:  The patient was placed supine on the operating table.  The cancer was identified, outlined with a marker, and verified by the patient.  The entire surgical field was prepped with Hibiclens.  The surgical site was anesthetized using Lidocaine 1% with epi 1:100,000.      The area of clinically apparent tumor was debulked. The layer of tissue was then surgically excised using a #15 blade and was then transferred onto a specimen sheet maintaining the orientation of the specimen. Hemostasis was obtained using bipolar electrocoagulation. The wound site was then covered with a dressing while the tissue samples were processed for examination.    The excised tissue was transported to the Mohs histology laboratory maintaining the tissue orientation.  The tissue specimen was relaxed so that the entire surgical margin was in a a single horizontal plane for sectioning and inked for precise mapping.  A precise reference map was drawn to reflect the sectioning of the specimen, colored inking of the margins, and orientation on the patient. The tissue was processed using horizontal  sectioning of the base and continuous peripheral margins.  The histopathologic sections were reviewed in conjunction with the reference map.    Total blocks: 1    Total slides:  3    There were no cancer cells visualized on examination, therefore Mohs surgery was complete.    Repair: Second intention healing  The patient is status post Mohs micrographic surgery.  The surgical site was examined with attention to normal anatomic and functional relationships.  After consideration and discussion of multiple options with the patient, it was determined that healing by second intention was preferred by the patient as he hoped to avoid additional cutting and suturing. The patient verbalized understanding and agrees with this plan. It is also understood that should second intention healing be sub-optimal, additional procedures such as scar revision, steroid injection or dermabrasion may be recommended.     The open wound was cleaned with hibiclens, and a thick layer of ointment was applied.  A pressure dressing consisting of non-adherent gauze, gauze and hypafix was applied. Wound care was discussed with patient both orally and in writing. The patient stated understanding and agreement of the course of care. He was discharged from the surgical suite in good condition.      He will return for evaluation of his wound as needed.     I personally performed the procedures today.      Romulo Coello DO    Department of Dermatology  [facility]: Phone: [phone], Fax:[phone]  [facility]: Phone: [phone], Fax: [phone]    Care and Laboratory Testing Performed at:  [facility]   Dermatology Clinic  [address] [place] [address]

## 2020-12-06 ENCOUNTER — HEALTH MAINTENANCE LETTER (OUTPATIENT)
Age: 50
End: 2020-12-06

## 2020-12-07 ENCOUNTER — VIRTUAL VISIT (OUTPATIENT)
Dept: DERMATOLOGY | Facility: CLINIC | Age: 50
End: 2020-12-07
Payer: COMMERCIAL

## 2020-12-07 DIAGNOSIS — C44.229 SQUAMOUS CELL CARCINOMA OF CONCHA OF LEFT EAR: Primary | ICD-10-CM

## 2020-12-07 PROCEDURE — 99024 POSTOP FOLLOW-UP VISIT: CPT | Mod: 95 | Performed by: DERMATOLOGY

## 2020-12-07 ASSESSMENT — PAIN SCALES - GENERAL: PAINLEVEL: NO PAIN (0)

## 2020-12-07 NOTE — LETTER
12/7/2020         RE: Wilner Clement  [address] [facility] [address]        Dear Colleague,    Thank you for referring your patient, Wilner Clement, to the [facility]. Please see a copy of my visit note below.    [facility] Dermatology Record:  Store and Forward and Telephone [phone]    Dermatology Problem List:  1. Melanoma: Melanoma in situ located on the Left Shoulder  s/p Excision on 2010  2. SCC, left chondral bowl.  Status post Mohs surgery and second intent healing 11/2/20  3. CNH, R ear  4. Dysplastic nevus (mild), L upper back    Encounter Date: Dec 7, 2020    CC:   Chief Complaint   Patient presents with     Wound Check     left canchl bowl 11/2/2020       History of Present Illness:  Wilner Clement is a 50 year old male who presents for a wound check on his left conchal bowl. He had Mohs to treat an SCC and the wound is healing by second intention.     Wound appears to be healing well.   Some small areas are still healing, but he is seeing progress.   No bleeding or weeping.   He continues to use the Mupirocin ointment and dressings daily.     ROS: Patient is generally feeling well today     Physical Examination:  General: Well-appearing, appropriately-developed individual.  Skin: Focused examination including left ear was performed.   - pink sclerotic patch left conchal bowl. Superficial erosion on the top edge of the scar.     Labs:  N/A    Past Medical History:   Patient Active Problem List   Diagnosis     Family history of diabetes mellitus     Family history of ischemic heart disease     Neoplasm of uncertain behavior of skin     CARDIOVASCULAR SCREENING; LDL GOAL LESS THAN 160     History of melanoma in situ     Family history of colon cancer     Cherry angioma     Multiple nevi     Past Medical History:   Diagnosis Date     Cancer (H)     Skin     Cancer (H)     melanoma- back     Lumbago 2003     MEDICAL HISTORY OF - 2003    left knee pain     MEDICAL  HISTORY OF - 2005,2014    colonoscopy- due 2019     Past Surgical History:   Procedure Laterality Date     BIOPSY  2011    Skin biopsy/mole on back removed.     BIOPSY      Skin cancer     COLONOSCOPY       COLONOSCOPY  2/24/2014    Procedure: COLONOSCOPY;  Colonoscopy, screening;  Surgeon: Alanis Piña MD;  Location: [facility] OR     COLONOSCOPY WITH CO2 INSUFFLATION N/A 4/8/2019    Procedure: COLONOSCOPY WITH CO2 INSUFFLATION;  Surgeon: Bj Stephens MD;  Location: [facility] OR     NO HISTORY OF SURGERY         Social History:  Patient reports that he has never smoked. He has never used smokeless tobacco. He reports current alcohol use. He reports that he does not use drugs.    Family History:  Family History   Problem Relation Age of Onset     Cancer - colorectal Father         diag. age 56     Hypertension Mother      Genitourinary Problems Mother         kidney insufficiency     Breast Cancer Sister      Diabetes Brother         maternal aunt     Asthma No family hx of      C.A.D. No family hx of      Cerebrovascular Disease No family hx of      Prostate Cancer No family hx of      Alcohol/Drug No family hx of      Allergies No family hx of      Alzheimer Disease No family hx of      Anesthesia Reaction No family hx of      Arthritis No family hx of      Blood Disease No family hx of      Cardiovascular No family hx of      Connective Tissue Disorder No family hx of      Congenital Anomalies No family hx of      Circulatory No family hx of      Cancer No family hx of      Depression No family hx of      Gastrointestinal Disease No family hx of      Eye Disorder No family hx of      Endocrine Disease No family hx of      Genetic Disorder No family hx of      Gynecology No family hx of      Heart Disease No family hx of      Lipids No family hx of      Family History Negative No family hx of      Thyroid Disease No family hx of      Respiratory No family hx of      Psychotic Disorder No family hx of       Osteoporosis No family hx of      Obesity No family hx of      Musculoskeletal Disorder No family hx of      Neurologic Disorder No family hx of      Hearing Loss No family hx of        Medications:  Current Outpatient Medications   Medication     mupirocin (BACTROBAN) 2 % external ointment     No current facility-administered medications for this visit.           Allergies   Allergen Reactions     No Known Drug Allergies        Impression and Recommendations (Patient Counseled on the Following):  1. SCC, left conchal bowl s/p Mohs and 2nd intention healing.   Wound healing appropriately.   Continue wound care until healed over completely with new pink skin.   Continue sun avoidance and sun protection.   Follow up for skin cancer screening in April 2021.      Staff only    Romulo Coello DO    Department of Dermatology  [facility]: Phone: [phone], Fax:[phone]  [facility]: Phone: [phone], Fax: [phone]    _____________________________________________________________________________    Teledermatology information:  - Location of patient: [place]  - Patient presented as: return  - Location of teledermatologist:  ([facility] )  - Image quality and interpretability: acceptable  - Physician has received verbal consent for a Video/Photos Visit from the patient? YES  - In-person dermatology visit recommendation: no  - Date of images: 12/6/20  - Service start time: 1530  - Service end time:   1533  - Date of report: 12/7/2020         Again, thank you for allowing me to participate in the care of your patient.        Sincerely,        Romulo Coello MD

## 2020-12-07 NOTE — PROGRESS NOTES
[facility] Dermatology Record:  Store and Forward and Telephone [phone]    Dermatology Problem List:  1. Melanoma: Melanoma in situ located on the Left Shoulder  s/p Excision on 2010  2. SCC, left chondral bowl.  Status post Mohs surgery and second intent healing 11/2/20  3. CNH, R ear  4. Dysplastic nevus (mild), L upper back    Encounter Date: Dec 7, 2020    CC:   Chief Complaint   Patient presents with     Wound Check     left canchl bowl 11/2/2020       History of Present Illness:  Wilner Clement is a 50 year old male who presents for a wound check on his left conchal bowl. He had Mohs to treat an SCC and the wound is healing by second intention.     Wound appears to be healing well.   Some small areas are still healing, but he is seeing progress.   No bleeding or weeping.   He continues to use the Mupirocin ointment and dressings daily.     ROS: Patient is generally feeling well today     Physical Examination:  General: Well-appearing, appropriately-developed individual.  Skin: Focused examination including left ear was performed.   - pink sclerotic patch left conchal bowl. Superficial erosion on the top edge of the scar.     Labs:  N/A    Past Medical History:   Patient Active Problem List   Diagnosis     Family history of diabetes mellitus     Family history of ischemic heart disease     Neoplasm of uncertain behavior of skin     CARDIOVASCULAR SCREENING; LDL GOAL LESS THAN 160     History of melanoma in situ     Family history of colon cancer     Cherry angioma     Multiple nevi     Past Medical History:   Diagnosis Date     Cancer (H)     Skin     Cancer (H)     melanoma- back     Lumbago 2003     MEDICAL HISTORY OF - 2003    left knee pain     MEDICAL HISTORY OF - 2005,2014    colonoscopy- due 2019     Past Surgical History:   Procedure Laterality Date     BIOPSY  2011    Skin biopsy/mole on back removed.     BIOPSY      Skin cancer     COLONOSCOPY       COLONOSCOPY  2/24/2014    Procedure: COLONOSCOPY;   Colonoscopy, screening;  Surgeon: Alanis Piña MD;  Location: [facility] OR     COLONOSCOPY WITH CO2 INSUFFLATION N/A 4/8/2019    Procedure: COLONOSCOPY WITH CO2 INSUFFLATION;  Surgeon: Bj Stephens MD;  Location: [facility] OR     NO HISTORY OF SURGERY         Social History:  Patient reports that he has never smoked. He has never used smokeless tobacco. He reports current alcohol use. He reports that he does not use drugs.    Family History:  Family History   Problem Relation Age of Onset     Cancer - colorectal Father         diag. age 56     Hypertension Mother      Genitourinary Problems Mother         kidney insufficiency     Breast Cancer Sister      Diabetes Brother         maternal aunt     Asthma No family hx of      C.A.D. No family hx of      Cerebrovascular Disease No family hx of      Prostate Cancer No family hx of      Alcohol/Drug No family hx of      Allergies No family hx of      Alzheimer Disease No family hx of      Anesthesia Reaction No family hx of      Arthritis No family hx of      Blood Disease No family hx of      Cardiovascular No family hx of      Connective Tissue Disorder No family hx of      Congenital Anomalies No family hx of      Circulatory No family hx of      Cancer No family hx of      Depression No family hx of      Gastrointestinal Disease No family hx of      Eye Disorder No family hx of      Endocrine Disease No family hx of      Genetic Disorder No family hx of      Gynecology No family hx of      Heart Disease No family hx of      Lipids No family hx of      Family History Negative No family hx of      Thyroid Disease No family hx of      Respiratory No family hx of      Psychotic Disorder No family hx of      Osteoporosis No family hx of      Obesity No family hx of      Musculoskeletal Disorder No family hx of      Neurologic Disorder No family hx of      Hearing Loss No family hx of        Medications:  Current Outpatient Medications   Medication     mupirocin  (BACTROBAN) 2 % external ointment     No current facility-administered medications for this visit.           Allergies   Allergen Reactions     No Known Drug Allergies        Impression and Recommendations (Patient Counseled on the Following):  1. SCC, left conchal bowl s/p Mohs and 2nd intention healing.   Wound healing appropriately.   Continue wound care until healed over completely with new pink skin.   Continue sun avoidance and sun protection.   Follow up for skin cancer screening in April 2021.      Staff only    Romulo Coello DO    Department of Dermatology  [facility]: Phone: [phone], Fax:[phone]  [facility]: Phone: [phone], Fax: [phone]    _____________________________________________________________________________    Teledermatology information:  - Location of patient: [place]  - Patient presented as: return  - Location of teledermatologist:  ([facility] )  - Image quality and interpretability: acceptable  - Physician has received verbal consent for a Video/Photos Visit from the patient? YES  - In-person dermatology visit recommendation: no  - Date of images: 12/6/20  - Service start time: 1530  - Service end time:   1533  - Date of report: 12/7/2020

## 2020-12-07 NOTE — NURSING NOTE
"Teledermatology Nurse Call for RETURN patients seen within the last 3 years:      The patient was contacted by phone and we reviewed they have a visit in teledermatology upcoming with an MD or JC;  Importantly, \"a teledermatology visit may not be as thorough as an in-person visit, and the quality of the photograph and/or video sent may not be of the same quality as that taken by the dermatology clinic.\"     We have found that certain health care needs can be provided without the need for an in-person physical exam.   If a prescription is necessary we can send it directly to your pharmacy.  If lab work is needed we can place an order for that and you can then stop by our lab to have the test done at a later time.Visits are billed at different rates depending on your insurance coverage. Please reach out to your insurance provider with any questions.    The patient chose to:                                                                                                                                                                                                                 Consent to a teledermatology visit with [employer] photos. Patient told by nursing these are already uploaded. Instructions sent to patient via [employer]. They verified they will be in the state of [place] at the time of the encounter.                                                                                                                                                                                                                                     The patient denied skin pain, fever, mucosal symptoms(lesions, blisters, sores in the mouth, nose, eyes, or genitals)  IF PATIENT ENDORSES ANY OF THESE STOP AND PAGE ON CALL ATTENDING    Gisselle Chopra LPN                                                                                                                                                                                                  "

## 2021-04-05 ENCOUNTER — OFFICE VISIT (OUTPATIENT)
Dept: DERMATOLOGY | Facility: CLINIC | Age: 51
End: 2021-04-05
Payer: COMMERCIAL

## 2021-04-05 DIAGNOSIS — L81.4 SOLAR LENTIGO: ICD-10-CM

## 2021-04-05 DIAGNOSIS — Z85.820 HISTORY OF MELANOMA: Primary | ICD-10-CM

## 2021-04-05 DIAGNOSIS — D22.9 MULTIPLE BENIGN NEVI: ICD-10-CM

## 2021-04-05 DIAGNOSIS — Z86.018 HISTORY OF DYSPLASTIC NEVUS: ICD-10-CM

## 2021-04-05 DIAGNOSIS — D18.01 CHERRY ANGIOMA: ICD-10-CM

## 2021-04-05 DIAGNOSIS — L73.8 SENILE SEBACEOUS GLAND HYPERPLASIA: ICD-10-CM

## 2021-04-05 DIAGNOSIS — L82.1 SEBORRHEIC KERATOSES: ICD-10-CM

## 2021-04-05 DIAGNOSIS — Z85.828 HISTORY OF SQUAMOUS CELL CARCINOMA OF SKIN: ICD-10-CM

## 2021-04-05 PROCEDURE — 99213 OFFICE O/P EST LOW 20 MIN: CPT | Performed by: DERMATOLOGY

## 2021-04-05 ASSESSMENT — PAIN SCALES - GENERAL: PAINLEVEL: NO PAIN (0)

## 2021-04-05 NOTE — PROGRESS NOTES
[facility] Dermatology Note  Encounter Date: Apr 5, 2021  Office Visit     Dermatology Problem List:  1. Melanoma: Melanoma in situ located on the Left Shoulder  s/p Excision on 2010  2. SCC, left chondral bowl.  Status post Mohs surgery and second intent healing 11/2/20  3. CNH, R ear  4. Dysplastic nevus (mild), L upper back  ____________________________________________    Assessment & Plan:    # History of DN: No evidence of recurrence. While these are benign, they are a marker for increased melanoma risk.  - Recommended yearly skin checks.    # History of melanoma, no clinical evidence of recurrence.   - ABCDEs: Counseled ABCDEs of melanoma: Asymmetry, Border (irregularity), Color (not uniform, changes in color), Diameter (greater than 6 mm which is about the size of a pencil eraser), and Evolving (any changes in preexisting moles).  - Sun protection: Counseled SPF30+ sunscreen, UPF clothing, sun avoidance, tanning bed avoidance.    # Benign findings, including: cherry angiomas, solar lentigo, seborrheic keratoses, sebaceous hyperplasia.   - No further intervention needed.     # Multiple clinically benign nevi.   - No further intervention needed.    # Left Lateral Ala: 6 mm papule with telangiectasias and focal crust   - Photo taken today, will clinically monitor         Procedures Performed:   None      Follow-up: 6 month(s) in-person, or earlier for new or changing lesions    Staff and Scribe:     Scribe Disclosure:   I, Sandhya Noe/Deny Voss, am serving as a scribe to document services personally performed by this physician, Dr. Romulo Coello, based on data collection and the provider's statements to me.     Patient was seen and staffed with Dr. Coello.    Aravind España, MS3  [employer]    Provider Disclosure:   The documentation recorded by the medical student and scribe accurately reflects the services I personally performed and the decisions made by  "me.  I personally performed the history, exam, assessment, and plan today.    Romulo Coello DO    Department of Dermatology  [facility]: Phone: [phone], Fax:[phone]  [facility]: Phone: [phone], Fax: [phone]    ____________________________________________    CC: Derm Problem (Wilner is here today for skin check, pt states \"no concerns\")    HPI:  Mr. Wilner Clement is a(n) 50 year old male who presents today as a return patient for a skin check.    Last seen by a virtual visit on 12/7/2020. At this time, the left conchal bowl s/p Mohs wound was healing appropriately.    Today, he has no lesions of concern. States that he has no new or changing lesions/ Patient denies bleeding lesions. Sites of previous surgeries are without discomfort or sensory changes. Patient notes lesion on the left lateral ala has been present for many years without changes in behavior or symptoms    Patient is otherwise feeling well, without additional skin concerns.    Labs Reviewed:  N/A    Physical Exam:  Vitals: There were no vitals taken for this visit.  SKIN: Total skin excluding the undergarment areas was performed. The exam included the head/face, neck, both arms, chest, back, abdomen, both legs, digits and/or nails.   - Well healed surgical scar with some hypopigmentation L conchal bowl  - There is a well healed surgical scar without erythema, nodularity or telangiectasias on the site of melanoma.  - Left Lateral Ala: 6 mm papule with telangiectasias and focal crust  - There are dome shaped bright red papules on the trunk.   - There are yellow oily papules with central umbilication located on the forehead.  - Multiple regular brown pigmented macules and papules are identified on the trunk.   - Scattered brown macules on sun exposed areas.  -There are waxy stuck on tan to brown papules on the " trunk.   - No other lesions of concern on areas examined.                Medications:  Current Outpatient Medications   Medication     mupirocin (BACTROBAN) 2 % external ointment     No current facility-administered medications for this visit.       Past Medical History:   Patient Active Problem List   Diagnosis     Family history of diabetes mellitus     Family history of ischemic heart disease     Neoplasm of uncertain behavior of skin     CARDIOVASCULAR SCREENING; LDL GOAL LESS THAN 160     History of melanoma in situ     Family history of colon cancer     Cherry angioma     Multiple nevi     Past Medical History:   Diagnosis Date     Cancer (H)     Skin     Cancer (H)     melanoma- back     Lumbago 2003     MEDICAL HISTORY OF - 2003    left knee pain     MEDICAL HISTORY OF - 2005,2014    colonoscopy- due 2019

## 2021-04-05 NOTE — NURSING NOTE
"Wilner Clement's goals for this visit include:   Chief Complaint   Patient presents with     Derm Problem     Wilner is here today for skin check, pt states \"no concerns\"       He requests these members of his care team be copied on today's visit information:     PCP: Jaye Wagner    Referring Provider:  No referring provider defined for this encounter.    There were no vitals taken for this visit.    Do you need any medication refills at today's visit? No    Kristina Vu LPN      "

## 2021-04-05 NOTE — LETTER
4/5/2021         RE: Wilner Clement  [address]  [place] [address]        Dear Colleague,    Thank you for referring your patient, Wilner Clement, to the [facility]. Please see a copy of my visit note below.    [facility] Dermatology Note  Encounter Date: Apr 5, 2021  Office Visit     Dermatology Problem List:  1. Melanoma: Melanoma in situ located on the Left Shoulder  s/p Excision on 2010  2. SCC, left chondral bowl.  Status post Mohs surgery and second intent healing 11/2/20  3. CNH, R ear  4. Dysplastic nevus (mild), L upper back  ____________________________________________    Assessment & Plan:    # History of DN: No evidence of recurrence. While these are benign, they are a marker for increased melanoma risk.  - Recommended yearly skin checks.    # History of melanoma, no clinical evidence of recurrence.   - ABCDEs: Counseled ABCDEs of melanoma: Asymmetry, Border (irregularity), Color (not uniform, changes in color), Diameter (greater than 6 mm which is about the size of a pencil eraser), and Evolving (any changes in preexisting moles).  - Sun protection: Counseled SPF30+ sunscreen, UPF clothing, sun avoidance, tanning bed avoidance.    # Benign findings, including: cherry angiomas, solar lentigo, seborrheic keratoses, sebaceous hyperplasia.   - No further intervention needed.     # Multiple clinically benign nevi.   - No further intervention needed.    # Left Lateral Ala: 6 mm papule with telangiectasias and focal crust   - Photo taken today, will clinically monitor         Procedures Performed:   None      Follow-up: 6 month(s) in-person, or earlier for new or changing lesions    Staff and Scribe:     Scribe Disclosure:   FELICE, Sandhya Noe/Deny Voss, am serving as a scribe to document services personally performed by this physician, Dr. Romulo Coello, based on data collection and the provider's statements to me.     Patient was seen and  "staffed with Dr. Coello.    Aravind España, MS3  [employer]    Provider Disclosure:   The documentation recorded by the medical student and scribe accurately reflects the services I personally performed and the decisions made by me.  I personally performed the history, exam, assessment, and plan today.    Romulo Coello DO    Department of Dermatology  [facility]: Phone: [phone], Fax:[phone]  [facility]: Phone: [phone], Fax: [phone]    ____________________________________________    CC: Derm Problem (Wilner is here today for skin check, pt states \"no concerns\")    HPI:  Mr. Wilner Clement is a(n) 50 year old male who presents today as a return patient for a skin check.    Last seen by a virtual visit on 12/7/2020. At this time, the left conchal bowl s/p Mohs wound was healing appropriately.    Today, he has no lesions of concern. States that he has no new or changing lesions/ Patient denies bleeding lesions. Sites of previous surgeries are without discomfort or sensory changes. Patient notes lesion on the left lateral ala has been present for many years without changes in behavior or symptoms    Patient is otherwise feeling well, without additional skin concerns.    Labs Reviewed:  N/A    Physical Exam:  Vitals: There were no vitals taken for this visit.  SKIN: Total skin excluding the undergarment areas was performed. The exam included the head/face, neck, both arms, chest, back, abdomen, both legs, digits and/or nails.   - Well healed surgical scar with some hypopigmentation L conchal bowl  - There is a well healed surgical scar without erythema, nodularity or telangiectasias on the site of melanoma.  - Left Lateral Ala: 6 mm papule with telangiectasias and focal crust  - There are dome shaped bright red papules on the trunk.   - There are yellow " oily papules with central umbilication located on the forehead.  - Multiple regular brown pigmented macules and papules are identified on the trunk.   - Scattered brown macules on sun exposed areas.  -There are waxy stuck on tan to brown papules on the trunk.   - No other lesions of concern on areas examined.                Medications:  Current Outpatient Medications   Medication     mupirocin (BACTROBAN) 2 % external ointment     No current facility-administered medications for this visit.       Past Medical History:   Patient Active Problem List   Diagnosis     Family history of diabetes mellitus     Family history of ischemic heart disease     Neoplasm of uncertain behavior of skin     CARDIOVASCULAR SCREENING; LDL GOAL LESS THAN 160     History of melanoma in situ     Family history of colon cancer     Cherry angioma     Multiple nevi     Past Medical History:   Diagnosis Date     Cancer (H)     Skin     Cancer (H)     melanoma- back     Lumbago 2003     MEDICAL HISTORY OF - 2003    left knee pain     MEDICAL HISTORY OF - 2005,2014    colonoscopy- due 2019            Again, thank you for allowing me to participate in the care of your patient.        Sincerely,        Romulo Coello MD

## 2021-04-26 ENCOUNTER — IMMUNIZATION (OUTPATIENT)
Dept: PEDIATRICS | Facility: CLINIC | Age: 51
End: 2021-04-26
Payer: COMMERCIAL

## 2021-04-26 PROCEDURE — 0001A PR COVID VAC PFIZER DIL RECON 30 MCG/0.3 ML IM: CPT

## 2021-04-26 PROCEDURE — 91300 PR COVID VAC PFIZER DIL RECON 30 MCG/0.3 ML IM: CPT

## 2021-05-17 ENCOUNTER — IMMUNIZATION (OUTPATIENT)
Dept: PEDIATRICS | Facility: CLINIC | Age: 51
End: 2021-05-17
Attending: INTERNAL MEDICINE
Payer: COMMERCIAL

## 2021-05-17 PROCEDURE — 0002A PR COVID VAC PFIZER DIL RECON 30 MCG/0.3 ML IM: CPT

## 2021-05-17 PROCEDURE — 91300 PR COVID VAC PFIZER DIL RECON 30 MCG/0.3 ML IM: CPT

## 2021-09-18 ENCOUNTER — MYC MEDICAL ADVICE (OUTPATIENT)
Dept: DERMATOLOGY | Facility: CLINIC | Age: 51
End: 2021-09-18

## 2021-09-26 ENCOUNTER — HEALTH MAINTENANCE LETTER (OUTPATIENT)
Age: 51
End: 2021-09-26

## 2021-09-27 ENCOUNTER — OFFICE VISIT (OUTPATIENT)
Dept: DERMATOLOGY | Facility: CLINIC | Age: 51
End: 2021-09-27
Payer: COMMERCIAL

## 2021-09-27 DIAGNOSIS — Z85.820 HISTORY OF MELANOMA: Primary | ICD-10-CM

## 2021-09-27 DIAGNOSIS — Z86.018 HISTORY OF DYSPLASTIC NEVUS: ICD-10-CM

## 2021-09-27 DIAGNOSIS — L82.1 SEBORRHEIC KERATOSES: ICD-10-CM

## 2021-09-27 DIAGNOSIS — D48.5 NEOPLASM OF UNCERTAIN BEHAVIOR OF SKIN: ICD-10-CM

## 2021-09-27 DIAGNOSIS — Z85.828 HISTORY OF SQUAMOUS CELL CARCINOMA OF SKIN: ICD-10-CM

## 2021-09-27 PROCEDURE — 88305 TISSUE EXAM BY PATHOLOGIST: CPT | Performed by: PATHOLOGY

## 2021-09-27 PROCEDURE — 99213 OFFICE O/P EST LOW 20 MIN: CPT | Mod: 25 | Performed by: DERMATOLOGY

## 2021-09-27 PROCEDURE — 11102 TANGNTL BX SKIN SINGLE LES: CPT | Performed by: DERMATOLOGY

## 2021-09-27 ASSESSMENT — PAIN SCALES - GENERAL: PAINLEVEL: NO PAIN (0)

## 2021-09-27 NOTE — NURSING NOTE
The following medication was given:     MEDICATION:  Lidocaine with epinephrine 1% [id number]  ROUTE: SQ  SITE: see procedure note  DOSE: 0.5cc  LOT #: [id number]  : Zunilda  EXPIRATION DATE: 6/2022  NDC#: 5473-2756-54  Was there drug waste? 1.5cc  Multi-dose vial: Yes    Gisselle Chopra LPN  September 27, 2021

## 2021-09-27 NOTE — PATIENT INSTRUCTIONS

## 2021-09-27 NOTE — LETTER
9/27/2021         RE: Wilner Clement  [address]  [place] [address]        Dear Colleague,    Thank you for referring your patient, Wilner Clement, to the [facility]. Please see a copy of my visit note below.    [facility] Dermatology Note  Encounter Date: Sep 27, 2021  Office Visit     Dermatology Problem List:  0. NUB - left superior conchal bowl - bx 9/27/21  1. Melanoma: Melanoma in situ located on the Left Shoulder s/p excision on 2010  2. SCC, left chondral bowl.  Status post Mohs surgery and second intent healing 11/2/20  3. CNH, R ear  4. Dysplastic nevus (mild), L upper back    ____________________________________________    Assessment & Plan:    # History of melanoma in situ, no clinical evidence of recurrence.  - ABCDEs: Counseled ABCDEs of melanoma: Asymmetry, Border (irregularity), Color (not uniform, changes in color), Diameter (greater than 6 mm which is about the size of a pencil eraser), and Evolving (any changes in preexisting moles).  - Sun protection: Counseled SPF30+ sunscreen, UPF clothing, sun avoidance, tanning bed avoidance.     # History of nonmelanoma skin cancer, no clincial evidence of recurrence.   - Sun protection: Counseled SPF30+ sunscreen, UPF clothing, sun avoidance, tanning bed avoidance.     # Neoplasm of uncertain behavior on the left superior conchal bowl. The differential diagnosis includes recurrent SCC, BCC, healing skin.   - See procedure note.     # Right Lateral Thigh: 5 mm brown macule with irregular borders.   - Photo documented today   - Will clinically monitor     # Left Lateral Ankle: 4 mm brown macule with irregular borders.   - Photo documented today   - Will clinically monitor     # Left Upper Abdomen: less than 1 cm irregularly bordered brown macule with uniform pigment network on dermoscopy.   - Photo documented today   - Will clinically monitor     # Seborrheic keratosis, non irritated.   - No further  intervention needed.     Procedures Performed:   - Shave biopsy procedure note, location(s): left superior conchal bowl. After discussion of benefits and risks including but not limited to bleeding, infection, scar, incomplete removal, recurrence, and non-diagnostic biopsy, written consent and photographs were obtained. The area was cleaned with isopropyl alcohol. 0.5mL of 1% lidocaine with epinephrine was injected to obtain adequate anesthesia of lesion(s). Shave biopsy at site(s) performed. Hemostasis was achieved with aluminium chloride. Petrolatum ointment and a sterile dressing were applied. The patient tolerated the procedure and no complications were noted. The patient was provided with verbal and written post care instructions.     Follow-up: pending path results    Staff and Scribe:     Scribe Disclosure:   I, Deny Voss, am serving as a scribe to document services personally performed by this physician, Dr. Romulo Coello, based on data collection and the provider's statements to me.     Provider Disclosure:   The documentation recorded by the scribe accurately reflects the services I personally performed and the decisions made by me.  I personally performed the procedures today.    Romulo Coello DO    Department of Dermatology  [facility]: Phone: [phone], Fax:[phone]  [facility]: Phone: [phone], Fax: [phone]    ____________________________________________    CC: Skin Check (area of concern left ear hx melanoma and NMSC and DN)    HPI:  Mr. Wilner Clement is a(n) 51 year old male who presents today as a return patient for a skin check.    Last seen on 4/5/21 for a skin check. At that time, a lesion to monitor was noted and photo documented on the left lateral ala.    Today, he has an area of concern on the left ear. He believes this spot has improved since  sending a photo on 9/18/21. He can't recall any specific injury or trauma to the injury, but believes it could have been a result of an injury. He notes he did receive an injury on his left forearm.    Patient is otherwise feeling well, without additional skin concerns.    Labs Reviewed:  N/A    Physical Exam:  Vitals: There were no vitals taken for this visit.  SKIN: Total skin excluding the undergarment areas was performed. The exam included the head/face, neck, both arms, chest, back, abdomen, both legs, digits and/or nails.   - Left Superior Conchal Bowl: pink poorly defined papule  - There is an erythematous macule with overyling adherent scale on the left helix.  - Weathing nodules noted on the helix, bilaterally.  - Right Lateral Thigh: 5 mm brown macule with irregular borders  - Left Lateral Ankle: 4 mm brown macule with irregular borders  - Left Upper Abdomen: less than 1 cm irregularly bordered brown macule with uniform pigment network on dermoscopy  - There are waxy stuck on tan to brown papules on the trunk.    - No other lesions of concern on areas examined.     Medications:  Current Outpatient Medications   Medication     mupirocin (BACTROBAN) 2 % external ointment     No current facility-administered medications for this visit.      Past Medical History:   Patient Active Problem List   Diagnosis     Family history of diabetes mellitus     Family history of ischemic heart disease     Neoplasm of uncertain behavior of skin     CARDIOVASCULAR SCREENING; LDL GOAL LESS THAN 160     History of melanoma in situ     Family history of colon cancer     Cherry angioma     Multiple nevi     Past Medical History:   Diagnosis Date     Cancer (H)     Skin     Cancer (H)     melanoma- back     Lumbago 2003     MEDICAL HISTORY OF - 2003    left knee pain     MEDICAL HISTORY OF - 2005,2014    colonoscopy- due 2019         No referring provider defined for this encounter. on close of this encounter.      Again, thank  you for allowing me to participate in the care of your patient.        Sincerely,        Romulo Coello MD

## 2021-09-27 NOTE — NURSING NOTE
Wilner Clement's goals for this visit include:   Chief Complaint   Patient presents with     Skin Check     area of concern left ear hx melanoma and NMSC and DN       He requests these members of his care team be copied on today's visit information:     PCP: Jaye Wagner    Referring Provider:  No referring provider defined for this encounter.    There were no vitals taken for this visit.    Do you need any medication refills at today's visit? No]      Gisselle Chopra LPN

## 2021-09-29 LAB
PATH REPORT.COMMENTS IMP SPEC: NORMAL
PATH REPORT.FINAL DX SPEC: NORMAL
PATH REPORT.GROSS SPEC: NORMAL
PATH REPORT.MICROSCOPIC SPEC OTHER STN: NORMAL
PATH REPORT.RELEVANT HX SPEC: NORMAL

## 2021-11-01 ENCOUNTER — MYC MEDICAL ADVICE (OUTPATIENT)
Dept: FAMILY MEDICINE | Facility: CLINIC | Age: 51
End: 2021-11-01
Payer: COMMERCIAL

## 2021-11-01 ENCOUNTER — MYC MEDICAL ADVICE (OUTPATIENT)
Dept: FAMILY MEDICINE | Facility: CLINIC | Age: 51
End: 2021-11-01

## 2021-12-31 ENCOUNTER — MYC MEDICAL ADVICE (OUTPATIENT)
Dept: FAMILY MEDICINE | Facility: CLINIC | Age: 51
End: 2021-12-31
Payer: COMMERCIAL

## 2022-01-15 ENCOUNTER — HEALTH MAINTENANCE LETTER (OUTPATIENT)
Age: 52
End: 2022-01-15

## 2022-03-23 ENCOUNTER — TELEPHONE (OUTPATIENT)
Dept: FAMILY MEDICINE | Facility: CLINIC | Age: 52
End: 2022-03-23
Payer: COMMERCIAL

## 2022-03-23 NOTE — PROGRESS NOTES
[facility] Dermatology Note  Encounter Date: Mar 30, 2022  Office Visit     Dermatology Problem List:  NUB - left helical rim and central abdomen bx 3/20/22  1. Melanoma: Melanoma in situ located on the Left Shoulder s/p excision on 2010  2. SCC, left chondral bowl.  Status post Mohs surgery and second intent healing 11/2/20  3. CNH, R ear  4. Dysplastic nevus (mild), L upper back  5. Benign Biopsy  - Epidermal hyperplasia, ectatic vessels and fibrosis - left superior conchal bowl - bx 9/27/21     ____________________________________________     Assessment & Plan:      # History of melanoma in situ, no clinical evidence of recurrence.  - ABCDEs: Counseled ABCDEs of melanoma: Asymmetry, Border (irregularity), Color (not uniform, changes in color), Diameter (greater than 6 mm which is about the size of a pencil eraser), and Evolving (any changes in preexisting moles).  - Sun protection: Counseled SPF30+ sunscreen, UPF clothing, sun avoidance, tanning bed avoidance.      # History of nonmelanoma skin cancer, no clincial evidence of recurrence.   - Sun protection: Counseled SPF30+ sunscreen, UPF clothing, sun avoidance, tanning bed avoidance.      # Right Lateral Thigh: 5 mm brown macule with irregular borders. Unchanged.    - Will clinically monitor      # Left Lateral Ankle: 4 mm brown macule with irregular borders. Unchanged.    - Will clinically monitor     # Neoplasm of uncertain behavior on the left helical rim. The differential diagnosis includes BCC vs SCC vs CNH vs scar/frost bite. .   - See procedure note.     # Neoplasm of uncertain behavior on the central abdomen left of mid line. The differential diagnosis includes nevus vs SL vs atypical nevus vs and melanoma insitu.   - See procedure note.     # Seborrheic keratosis.   - No further intervention   needed.    # Cherry angiomas   - No further intervention needed.      Procedures Performed:   - Shave biopsy procedure note, location(s):  left helical rim and central abdomen After discussion of benefits and risks including but not limited to bleeding, infection, scar, incomplete removal, recurrence, and non-diagnostic biopsy, written consent and photographs were obtained. The area was cleaned with isopropyl alcohol. 0.5mL of 1% lidocaine with epinephrine was injected to obtain adequate anesthesia of lesion(s). Shave biopsy at site(s) performed. Hemostasis was achieved with aluminium chloride. Petrolatum ointment and a sterile dressing were applied. The patient tolerated the procedure and no complications were noted. The patient was provided with verbal and written post care instructions.     Follow-up: pending path results    Staff and Scribe:   Scribe Disclosure:   Juan VIVEROS, am serving as a scribe to document services personally performed by this physician, Dr. Romulo Coello, based on data collection and the provider's statements to me.       Provider Disclosure:   The documentation recorded by the scribe accurately reflects the services I personally performed and the decisions made by me.  I personally performed the procedures today.    Romulo Coello DO    Department of Dermatology  [facility]: Phone: [phone], Fax:[phone]  [facility]: Phone: [phone], Fax: [phone]    ____________________________________________    CC: Skin Check (hx of melanoma)    HPI:  Mr. Wilner Clement is a(n) 51 year old male who presents today as a return patient for a skin check.     Last seen on 9/27/21 for a skin check. A bx was performed on the left superior conchal bowl (Epidermal hyperplasia, ectatic vessels and fibrosis). Lesions to monitor at the right lateral thigh, left lateral ankle, and right upper abdomen.     Today, patient presents for a skin check.     Patient is otherwise feeling well, without additional skin  concerns.    Labs Reviewed:  N/A    Physical Exam:  Vitals: There were no vitals taken for this visit.  SKIN: Total skin excluding the undergarment areas was performed. The exam included the head/face, neck, both arms, chest, back, abdomen, both legs, digits and/or nails.   -  Left helical rim: 3mm firm scaly pink papule  - There are waxy stuck on tan to brown papules on the trunk and extremities..   - There are dome shaped bright red papules on the trunk and extremities.   - Central abdomen left of mid line: tan macule with dark brown half  - No other lesions of concern on areas examined.  LYMPH NODES: No submandibular, cervical, supraclavicular, axillary, or inguinal lymphadenopathy palpable on examination.        Medications:  Current Outpatient Medications   Medication     mupirocin (BACTROBAN) 2 % external ointment     No current facility-administered medications for this visit.      Past Medical History:   Patient Active Problem List   Diagnosis     Family history of diabetes mellitus     Family history of ischemic heart disease     Neoplasm of uncertain behavior of skin     CARDIOVASCULAR SCREENING; LDL GOAL LESS THAN 160     History of melanoma in situ     Family history of colon cancer     Cherry angioma     Multiple nevi     Past Medical History:   Diagnosis Date     Cancer (H)     Skin     Cancer (H)     melanoma- back     Lumbago 2003     MEDICAL HISTORY OF - 2003    left knee pain     MEDICAL HISTORY OF - 2005,2014    colonoscopy- due 2019

## 2022-03-23 NOTE — TELEPHONE ENCOUNTER
Patient calling to see if there are any same day appointments available in [place] for acute ankle injury. Patient reports he fell off a retaining wall, about 2 feet high and injured his left ankle.     Patient states his ankle is swollen and painful. He is able to walk on it, finished his work today.     Advised patient to go to  for evaluation. He was agreeable and will go to  in [place]. He had no further questions.     Vijaya Dewitt RN on 3/23/2022 at 1:17 PM

## 2022-03-30 ENCOUNTER — OFFICE VISIT (OUTPATIENT)
Dept: DERMATOLOGY | Facility: CLINIC | Age: 52
End: 2022-03-30
Payer: COMMERCIAL

## 2022-03-30 DIAGNOSIS — L81.4 SOLAR LENTIGINOSIS: ICD-10-CM

## 2022-03-30 DIAGNOSIS — D48.5 NEOPLASM OF UNCERTAIN BEHAVIOR OF SKIN: ICD-10-CM

## 2022-03-30 DIAGNOSIS — D22.9 MULTIPLE BENIGN NEVI: ICD-10-CM

## 2022-03-30 DIAGNOSIS — L82.1 SEBORRHEIC KERATOSES: ICD-10-CM

## 2022-03-30 DIAGNOSIS — Z85.828 HISTORY OF NONMELANOMA SKIN CANCER: Primary | ICD-10-CM

## 2022-03-30 DIAGNOSIS — D18.01 CHERRY ANGIOMA: ICD-10-CM

## 2022-03-30 DIAGNOSIS — Z86.006 HISTORY OF MELANOMA IN SITU: ICD-10-CM

## 2022-03-30 PROCEDURE — 88305 TISSUE EXAM BY PATHOLOGIST: CPT | Performed by: DERMATOLOGY

## 2022-03-30 PROCEDURE — 11103 TANGNTL BX SKIN EA SEP/ADDL: CPT | Performed by: DERMATOLOGY

## 2022-03-30 PROCEDURE — 99213 OFFICE O/P EST LOW 20 MIN: CPT | Mod: 25 | Performed by: DERMATOLOGY

## 2022-03-30 PROCEDURE — 11102 TANGNTL BX SKIN SINGLE LES: CPT | Performed by: DERMATOLOGY

## 2022-03-30 NOTE — NURSING NOTE
Wilner Clement's goals for this visit include:   Chief Complaint   Patient presents with     Skin Check     hx of melanoma       He requests these members of his care team be copied on today's visit information:     PCP: Jaye Wagner    Referring Provider:  No referring provider defined for this encounter.    There were no vitals taken for this visit.    Do you need any medication refills at today's visit? No  Roma Yang, ROSANNA on 3/30/2022 at 1:47 PM

## 2022-03-30 NOTE — PATIENT INSTRUCTIONS
Wound Care After a Biopsy    What is a skin biopsy?  A skin biopsy allows the doctor to examine a very small piece of tissue under the microscope to determine the diagnosis and the best treatment for the skin condition. A local anesthetic (numbing medicine)  is injected with a very small needle into the skin area to be tested. A small piece of skin is taken from the area. Sometimes a suture (stitch) is used.     What are the risks of a skin biopsy?  I will experience scar, bleeding, swelling, pain, crusting and redness. I may experience incomplete removal or recurrence. Risks of this procedure are excessive bleeding, bruising, infection, nerve damage, numbness, thick (hypertrophic or keloidal) scar and non-diagnostic biopsy.    How should I care for my wound for the first 24 hours?    Keep the wound dry and covered for 24 hours    If it bleeds, hold direct pressure on the area for 15 minutes. If bleeding does not stop then go to the emergency room    Avoid strenuous exercise the first 1-2 days or as your doctor instructs you    How should I care for the wound after 24 hours?    After 24 hours, remove the bandage    You may bathe or shower as normal    If you had a scalp biopsy, you can shampoo as usual and can use shower water to clean the biopsy site daily    Clean the wound twice a day with gentle soap and water    Do not scrub, be gentle    Apply white petroleum/Vaseline after cleaning the wound with a cotton swab or a clean finger, and keep the site covered with a Bandaid /bandage. Bandages are not necessary with a scalp biopsy    If you are unable to cover the site with a Bandaid /bandage, re-apply ointment 2-3 times a day to keep the site moist. Moisture will help with healing    Avoid strenuous activity for first 1-2 days    Avoid lakes, rivers, pools, and oceans until the stitches are removed or the site is healed    How do I clean my wound?    Wash hands thoroughly with soap or use hand  before all  wound care    Clean the wound with gentle soap and water    Apply white petroleum/Vaseline  to wound after it is clean    Replace the Bandaid /bandage to keep the wound covered for the first few days or as instructed by your doctor    If you had a scalp biopsy, warm shower water to the area on a daily basis should suffice    What should I use to clean my wound?     Cotton-tipped applicators (Qtips )    White petroleum jelly (Vaseline ). Use a clean new container and use Q-tips to apply.    Bandaids   as needed    Gentle soap     How should I care for my wound long term?    Do not get your wound dirty    Keep up with wound care for one week or until the area is healed.    A small scab will form and fall off by itself when the area is completely healed. The area will be red and will become pink in color as it heals. Sun protection is very important for how your scar will turn out. Sunscreen with an SPF 30 or greater is recommended once the area is healed.    You should have some soreness but it should be mild and slowly go away over several days. Talk to your doctor about using tylenol for pain,    When should I call my doctor?  If you have increased:     Pain or swelling    Pus or drainage (clear or slightly yellow drainage is ok)    Temperature over 100F    Spreading redness or warmth around wound    When will I hear about my results?  The biopsy results can take 2 weeks to come back.  Your results will automatically release to [employer] before your provider has even reviewed them.  The clinic will call you with the results, send you a [facility] message, or have you schedule a follow-up clinic or phone time to discuss the results.  Contact our clinics if you do not hear from us in 2 weeks.    Who should I call with questions?    [facility]: [phone]    [facility]: [phone]    For urgent needs outside of business hours call the [facility] at  [phone] and ask for the dermatology resident on call        Patient Education     Checking for Skin Cancer  You can find cancer early by checking your skin each month. There are 3 kinds of skin cancer. They are melanoma, basal cell carcinoma, and squamous cell carcinoma. Doing monthly skin checks is the best way to find new marks or skin changes. Follow the instructions below for checking your skin.   The ABCDEs of checking moles for melanoma   Check your moles or growths for signs of melanoma using ABCDE:     Asymmetry: the sides of the mole or growth don t match    Border: the edges are ragged, notched, or blurred    Color: the color within the mole or growth varies    Diameter: the mole or growth is larger than 6 mm (size of a pencil eraser)    Evolving: the size, shape, or color of the mole or growth is changing (evolving is not shown in the images below)    Checking for other types of skin cancer  Basal cell carcinoma or squamous cell carcinoma have symptoms such as:       A spot or mole that looks different from all other marks on your skin    Changes in how an area feels, such as itching, tenderness, or pain    Changes in the skin's surface, such as oozing, bleeding, or scaliness    A sore that does not heal    New swelling or redness beyond the border of a mole    Who s at risk?  Anyone can get skin cancer. But you are at greater risk if you have:     Fair skin, light-colored hair, or light-colored eyes    Many moles or abnormal moles on your skin    A history of sunburns from sunlight or tanning beds    A family history of skin cancer    A history of exposure to radiation or chemicals    A weakened immune system  If you have had skin cancer in the past, you are at risk for recurring skin cancer.   How to check your skin  Do your monthly skin checkups in front of a full-length mirror. Check all parts of your body, including your:     Head (ears, face, neck, and scalp)    Torso (front, back, and  sides)    Arms (tops, undersides, upper, and lower armpits)    Hands (palms, backs, and fingers, including under the nails)    Buttocks and genitals    Legs (front, back, and sides)    Feet (tops, soles, toes, including under the nails, and between toes)  If you have a lot of moles, take digital photos of them each month. Make sure to take photos both up close and from a distance. These can help you see if any moles change over time.   Most skin changes are not cancer. But if you see any changes in your skin, call your doctor right away. Only he or she can diagnose a problem. If you have skin cancer, seeing your doctor can be the first step toward getting the treatment that could save your life.   [employer] last reviewed this educational content on 4/1/2019 2000-2020 [employer]. [address] [place] [address]. All rights reserved. This information is not intended as a substitute for professional medical care. Always follow your healthcare professional's instructions.       When should I call my doctor?    If you are worsening or not improving, please, contact us or seek urgent care as noted below.     Who should I call with questions (adults)?    [facility] (adult and pediatric): [phone]    [facility] (adult): [phone]    For urgent needs outside of business hours call the [facility] at [phone] and ask for the dermatology resident on call to be paged    If this is a medical emergency and you are unable to reach an ER, Call [phone]    Who should I call with questions (pediatric)?  [facility]- Pediatric Dermatology  Dr. Nery Arvizu, Dr. Caitlyn Cruz, Dr. Manuela Dick, Aruna Vidal, PA, Dr. Amberly Benavides, Dr. Melvi Méndez & Dr. Dhruv Marroquin  Non-urgent nurse triage line; [phone]- Caity and Lisha ROGEL Care Coordinatoralmita Diaz (/Complex )  [phone]    If you need a prescription refill, please contact your pharmacy. Refills are approved or denied by our Physicians during normal business hours, Monday through Fridays  Per office policy, refills will not be granted if you have not been seen within the past year (or sooner depending on your child's condition)    Scheduling Information:  Pediatric Appointment Scheduling and Call Center [phone]  Radiology Scheduling- [phone]  Sedation Unit Scheduling- [phone]  [place] Scheduling- General [phone]; Pediatric Dermatology [phone]  Main  Services: [phone]  Somali: [phone]  British: [phone]  Hmong/Ghanaian/[place]ollis: [phone]  Preadmission Nursing Department Fax Number: [phone] (Fax all pre-operative paperwork to this number)    For urgent matters arising during evenings, weekends, or holidays that cannot wait for normal business hours please call [phone] and ask for the dermatology resident on call to be paged.

## 2022-03-30 NOTE — LETTER
3/30/2022         RE: Wilner Clement  [address]  [place] [address]        Dear Colleague,    Thank you for referring your patient, Wilner Clement, to the [facility]. Please see a copy of my visit note below.    [facility] Dermatology Note  Encounter Date: Mar 30, 2022  Office Visit     Dermatology Problem List:  NUB - left helical rim and central abdomen bx 3/20/22  1. Melanoma: Melanoma in situ located on the Left Shoulder s/p excision on 2010  2. SCC, left chondral bowl.  Status post Mohs surgery and second intent healing 11/2/20  3. CNH, R ear  4. Dysplastic nevus (mild), L upper back  5. Benign Biopsy  - Epidermal hyperplasia, ectatic vessels and fibrosis - left superior conchal bowl - bx 9/27/21     ____________________________________________     Assessment & Plan:      # History of melanoma in situ, no clinical evidence of recurrence.  - ABCDEs: Counseled ABCDEs of melanoma: Asymmetry, Border (irregularity), Color (not uniform, changes in color), Diameter (greater than 6 mm which is about the size of a pencil eraser), and Evolving (any changes in preexisting moles).  - Sun protection: Counseled SPF30+ sunscreen, UPF clothing, sun avoidance, tanning bed avoidance.      # History of nonmelanoma skin cancer, no clincial evidence of recurrence.   - Sun protection: Counseled SPF30+ sunscreen, UPF clothing, sun avoidance, tanning bed avoidance.      # Right Lateral Thigh: 5 mm brown macule with irregular borders. Unchanged.    - Will clinically monitor      # Left Lateral Ankle: 4 mm brown macule with irregular borders. Unchanged.    - Will clinically monitor     # Neoplasm of uncertain behavior on the left helical rim. The differential diagnosis includes BCC vs SCC vs CNH vs scar/frost bite. .   - See procedure note.     # Neoplasm of uncertain behavior on the central abdomen left of mid line. The differential diagnosis includes nevus vs SL vs atypical  nevus vs and melanoma insitu.   - See procedure note.     # Seborrheic keratosis.   - No further intervention   needed.    # Cherry angiomas   - No further intervention needed.      Procedures Performed:   - Shave biopsy procedure note, location(s): left helical rim and central abdomen After discussion of benefits and risks including but not limited to bleeding, infection, scar, incomplete removal, recurrence, and non-diagnostic biopsy, written consent and photographs were obtained. The area was cleaned with isopropyl alcohol. 0.5mL of 1% lidocaine with epinephrine was injected to obtain adequate anesthesia of lesion(s). Shave biopsy at site(s) performed. Hemostasis was achieved with aluminium chloride. Petrolatum ointment and a sterile dressing were applied. The patient tolerated the procedure and no complications were noted. The patient was provided with verbal and written post care instructions.     Follow-up: pending path results    Staff and Scribe:   Scribe Disclosure:   IJuan, am serving as a scribe to document services personally performed by this physician, Dr. Romulo Coello, based on data collection and the provider's statements to me.       Provider Disclosure:   The documentation recorded by the scribe accurately reflects the services I personally performed and the decisions made by me.  I personally performed the procedures today.    Romulo Coello DO    Department of Dermatology  [facility]: Phone: [phone], Fax:[phone]  [facility]: Phone: [phone], Fax: [phone]    ____________________________________________    CC: Skin Check (hx of melanoma)    HPI:  Mr. Wilner Clement is a(n) 51 year old male who presents today as a return patient for a skin check.     Last seen on 9/27/21 for a skin check. A bx was performed on the left superior conchal bowl  (Epidermal hyperplasia, ectatic vessels and fibrosis). Lesions to monitor at the right lateral thigh, left lateral ankle, and right upper abdomen.     Today, patient presents for a skin check.     Patient is otherwise feeling well, without additional skin concerns.    Labs Reviewed:  N/A    Physical Exam:  Vitals: There were no vitals taken for this visit.  SKIN: Total skin excluding the undergarment areas was performed. The exam included the head/face, neck, both arms, chest, back, abdomen, both legs, digits and/or nails.   -  Left helical rim: 3mm firm scaly pink papule  - There are waxy stuck on tan to brown papules on the trunk and extremities..   - There are dome shaped bright red papules on the trunk and extremities.   - Central abdomen left of mid line: tan macule with dark brown half  - No other lesions of concern on areas examined.  LYMPH NODES: No submandibular, cervical, supraclavicular, axillary, or inguinal lymphadenopathy palpable on examination.        Medications:  Current Outpatient Medications   Medication     mupirocin (BACTROBAN) 2 % external ointment     No current facility-administered medications for this visit.      Past Medical History:   Patient Active Problem List   Diagnosis     Family history of diabetes mellitus     Family history of ischemic heart disease     Neoplasm of uncertain behavior of skin     CARDIOVASCULAR SCREENING; LDL GOAL LESS THAN 160     History of melanoma in situ     Family history of colon cancer     Cherry angioma     Multiple nevi     Past Medical History:   Diagnosis Date     Cancer (H)     Skin     Cancer (H)     melanoma- back     Lumbago 2003     MEDICAL HISTORY OF - 2003    left knee pain     MEDICAL HISTORY OF - 2005,2014    colonoscopy- due 2019            Again, thank you for allowing me to participate in the care of your patient.        Sincerely,        Romulo Coello MD

## 2022-04-01 LAB
PATH REPORT.COMMENTS IMP SPEC: NORMAL
PATH REPORT.COMMENTS IMP SPEC: NORMAL
PATH REPORT.FINAL DX SPEC: NORMAL
PATH REPORT.GROSS SPEC: NORMAL
PATH REPORT.MICROSCOPIC SPEC OTHER STN: NORMAL
PATH REPORT.RELEVANT HX SPEC: NORMAL

## 2022-07-20 ASSESSMENT — ENCOUNTER SYMPTOMS
HEADACHES: 0
COUGH: 0
HEARTBURN: 0
MYALGIAS: 0
HEMATOCHEZIA: 0
ABDOMINAL PAIN: 0
ARTHRALGIAS: 0
WEAKNESS: 0
DIARRHEA: 0
FREQUENCY: 0
NERVOUS/ANXIOUS: 0
HEMATURIA: 0
CHILLS: 0
DYSURIA: 0
EYE PAIN: 0
JOINT SWELLING: 1
NAUSEA: 0
CONSTIPATION: 0
SHORTNESS OF BREATH: 0
SORE THROAT: 0
PARESTHESIAS: 0
DIZZINESS: 0
PALPITATIONS: 0
FEVER: 0

## 2022-07-27 ENCOUNTER — OFFICE VISIT (OUTPATIENT)
Dept: FAMILY MEDICINE | Facility: CLINIC | Age: 52
End: 2022-07-27
Payer: COMMERCIAL

## 2022-07-27 VITALS
HEART RATE: 57 BPM | TEMPERATURE: 97.6 F | SYSTOLIC BLOOD PRESSURE: 154 MMHG | BODY MASS INDEX: 27.77 KG/M2 | DIASTOLIC BLOOD PRESSURE: 90 MMHG | WEIGHT: 205 LBS | RESPIRATION RATE: 11 BRPM | HEIGHT: 72 IN | OXYGEN SATURATION: 99 %

## 2022-07-27 DIAGNOSIS — Z13.220 LIPID SCREENING: ICD-10-CM

## 2022-07-27 DIAGNOSIS — Z28.21 COVID-19 VACCINATION DECLINED: ICD-10-CM

## 2022-07-27 DIAGNOSIS — M25.572 PAIN IN JOINT, ANKLE AND FOOT, LEFT: ICD-10-CM

## 2022-07-27 DIAGNOSIS — Z13.1 SCREENING FOR DIABETES MELLITUS: ICD-10-CM

## 2022-07-27 DIAGNOSIS — Z00.00 ROUTINE GENERAL MEDICAL EXAMINATION AT A HEALTH CARE FACILITY: Primary | ICD-10-CM

## 2022-07-27 DIAGNOSIS — R03.0 ELEVATED BLOOD PRESSURE READING WITHOUT DIAGNOSIS OF HYPERTENSION: ICD-10-CM

## 2022-07-27 DIAGNOSIS — Z53.20 PROSTATE CANCER SCREENING DECLINED: ICD-10-CM

## 2022-07-27 DIAGNOSIS — Z86.006 HISTORY OF MELANOMA IN SITU: ICD-10-CM

## 2022-07-27 PROCEDURE — 80061 LIPID PANEL: CPT | Performed by: FAMILY MEDICINE

## 2022-07-27 PROCEDURE — 80048 BASIC METABOLIC PNL TOTAL CA: CPT | Performed by: FAMILY MEDICINE

## 2022-07-27 PROCEDURE — 36415 COLL VENOUS BLD VENIPUNCTURE: CPT | Performed by: FAMILY MEDICINE

## 2022-07-27 PROCEDURE — 99396 PREV VISIT EST AGE 40-64: CPT | Performed by: FAMILY MEDICINE

## 2022-07-27 PROCEDURE — 99213 OFFICE O/P EST LOW 20 MIN: CPT | Mod: 25 | Performed by: FAMILY MEDICINE

## 2022-07-27 ASSESSMENT — ENCOUNTER SYMPTOMS
DYSURIA: 0
ABDOMINAL PAIN: 0
ARTHRALGIAS: 0
SHORTNESS OF BREATH: 0
DIZZINESS: 0
FEVER: 0
NAUSEA: 0
CHILLS: 0
PARESTHESIAS: 0
DIARRHEA: 0
FREQUENCY: 0
COUGH: 0
MYALGIAS: 0
HEMATURIA: 0
PALPITATIONS: 0
SORE THROAT: 0
CONSTIPATION: 0
EYE PAIN: 0
NERVOUS/ANXIOUS: 0
HEMATOCHEZIA: 0
HEADACHES: 0
JOINT SWELLING: 1
WEAKNESS: 0
HEARTBURN: 0

## 2022-07-27 ASSESSMENT — PAIN SCALES - GENERAL: PAINLEVEL: NO PAIN (0)

## 2022-07-27 NOTE — PATIENT INSTRUCTIONS
[email or web address]    Check with insurance about the shingles vaccine    Get a blood pressure cuff and check every other day for 2 weeks and mychart message me these results. If greater than 140/90 more than half the time we should consider starting a medication.      Preventive Health Recommendations  Male Ages 50 - 64    Yearly exam:             See your health care provider every year in order to  o   Review health changes.   o   Discuss preventive care.    o   Review your medicines if your doctor has prescribed any.   Have a cholesterol test every 5 years, or more frequently if you are at risk for high cholesterol/heart disease.   Have a diabetes test (fasting glucose) every three years. If you are at risk for diabetes, you should have this test more often.   Have a colonoscopy at age 50, or have a yearly FIT test (stool test). These exams will check for colon cancer.    Talk with your health care provider about whether or not a prostate cancer screening test (PSA) is right for you.  You should be tested each year for STDs (sexually transmitted diseases), if you re at risk.     Shots: Get a flu shot each year. Get a tetanus shot every 10 years.     Nutrition:  Eat at least 5 servings of fruits and vegetables daily.   Eat whole-grain bread, whole-wheat pasta and brown rice instead of white grains and rice.   Get adequate Calcium and Vitamin D.     Lifestyle  Exercise for at least 150 minutes a week (30 minutes a day, 5 days a week). This will help you control your weight and prevent disease.   Limit alcohol to one drink per day.   No smoking.   Wear sunscreen to prevent skin cancer.   See your dentist every six months for an exam and cleaning.   See your eye doctor every 1 to 2 years.

## 2022-07-27 NOTE — PROGRESS NOTES
SUBJECTIVE:   CC: Wilner Clement is an 52 year old male who presents for preventative health visit.     Patient has been advised of split billing requirements and indicates understanding: Yes  Healthy Habits:     Getting at least 3 servings of Calcium per day:  Yes    Bi-annual eye exam:  Yes    Dental care twice a year:  Yes    Sleep apnea or symptoms of sleep apnea:  None    Diet:  Carbohydrate counting    Frequency of exercise:  1 day/week    Duration of exercise:  15-30 minutes    Taking medications regularly:  Yes    Medication side effects:  None    PHQ-2 Total Score: 0    Additional concerns today:  Yes    Had an inversion to his left ankle and was seen by [place] family in [facility] urgent care this spring.  I do not have access to these records.  He states he was given a Tri-Lock brace, was told to do proprioception exercises, that it would improve with time.  He states that x-ray was normal however I do not have access to these records.    This was a work-related injury as he states he stepped off a retaining wall.    He has no other questions or concerns today.    He follows up with a dermatologist every 6 months due to history of skin cancer including melanoma.    After risk-benefit discussion he declines PSA screening or digital rectal exam.    Due to family history of colon cancer he is being screened every 5 years and will be due again in 2024.    Today's PHQ-2 Score:   PHQ-2 ( 1999 Pfizer) 7/20/2022   Q1: Little interest or pleasure in doing things 0   Q2: Feeling down, depressed or hopeless 0   PHQ-2 Score 0   PHQ-2 Total Score (12-17 Years)- Positive if 3 or more points; Administer PHQ-A if positive -   Q1: Little interest or pleasure in doing things Not at all   Q2: Feeling down, depressed or hopeless Not at all   PHQ-2 Score 0       Abuse: Current or Past(Physical, Sexual or Emotional)- No  Do you feel safe in your environment? Yes    Have you ever done Advance Care Planning? (For  example, a Health Directive, POLST, or a discussion with a medical provider or your loved ones about your wishes): No, advance care planning information given to patient to review.  Patient plans to discuss their wishes with loved ones or provider.      Social History     Tobacco Use     Smoking status: Never Smoker     Smokeless tobacco: Never Used   Substance Use Topics     Alcohol use: Yes     Comment: 2-3 drinks per week      Alcohol Use 7/20/2022   Prescreen: >3 drinks/day or >7 drinks/week? No   Prescreen: >3 drinks/day or >7 drinks/week? -     Last PSA:   PSA   Date Value Ref Range Status   11/05/2019 0.74 0 - 4 ug/L Final     Comment:     Assay Method:  Chemiluminescence using Siemens Vista analyzer     Reviewed orders with patient. Reviewed health maintenance and updated orders accordingly - Yes  BP Readings from Last 3 Encounters:   07/27/22 (!) 154/90   11/02/20 (!) 157/104   11/05/19 108/78    Wt Readings from Last 3 Encounters:   07/27/22 93 kg (205 lb)   11/05/19 113.2 kg (249 lb 8 oz)   04/03/19 108.9 kg (240 lb)          Patient Active Problem List   Diagnosis     Family history of diabetes mellitus     Family history of ischemic heart disease     Neoplasm of uncertain behavior of skin     CARDIOVASCULAR SCREENING; LDL GOAL LESS THAN 160     History of melanoma in situ     Family history of colon cancer     Cherry angioma     Multiple nevi     Past Surgical History:   Procedure Laterality Date     BIOPSY  01/01/2011    Skin biopsy/mole on back removed.     BIOPSY      Skin cancer     COLONOSCOPY       COLONOSCOPY  02/24/2014    Procedure: COLONOSCOPY;  Colonoscopy, screening;  Surgeon: Alanis Piña MD;  Location: [facility] OR     COLONOSCOPY WITH CO2 INSUFFLATION N/A 04/08/2019    Procedure: COLONOSCOPY WITH CO2 INSUFFLATION;  Surgeon: Bj Stephens MD;  Location: [facility] OR     GI SURGERY  4/8/2019       Social History     Tobacco Use     Smoking status: Never Smoker     Smokeless tobacco:  Never Used   Substance Use Topics     Alcohol use: Yes     Comment: 2-3 drinks per week      Family History   Problem Relation Age of Onset     Hypertension Mother      Genitourinary Problems Mother         kidney insufficiency     Kidney Cancer Mother      Cancer - colorectal Father         diag. age 56     Breast Cancer Sister      Diabetes Brother      Breast Cancer Maternal Grandmother      Asthma No family hx of      C.A.D. No family hx of      Cerebrovascular Disease No family hx of      Prostate Cancer No family hx of      Alcohol/Drug No family hx of      Allergies No family hx of      Alzheimer Disease No family hx of      Anesthesia Reaction No family hx of      Arthritis No family hx of      Blood Disease No family hx of      Cardiovascular No family hx of      Connective Tissue Disorder No family hx of      Congenital Anomalies No family hx of      Circulatory No family hx of      Cancer No family hx of      Depression No family hx of      Gastrointestinal Disease No family hx of      Eye Disorder No family hx of      Endocrine Disease No family hx of      Genetic Disorder No family hx of      Gynecology No family hx of      Heart Disease No family hx of      Lipids No family hx of      Family History Negative No family hx of      Thyroid Disease No family hx of      Respiratory No family hx of      Psychotic Disorder No family hx of      Osteoporosis No family hx of      Obesity No family hx of      Musculoskeletal Disorder No family hx of      Neurologic Disorder No family hx of      Hearing Loss No family hx of          No current outpatient medications on file.     Allergies   Allergen Reactions     No Known Drug Allergies      Reviewed and updated as needed this visit by clinical staff   Tobacco  Allergies  Meds  Problems  Med Hx  Surg Hx  Fam Hx            Reviewed and updated as needed this visit by Provider   Tobacco  Allergies  Meds  Problems  Med Hx  Surg Hx  Fam Hx         Social  "history reviewed  Past Medical History:   Diagnosis Date     Cancer (H)     Skin     Cancer (H)     melanoma- back     Lumbago 01/01/2003     MEDICAL HISTORY OF - 01/01/2003    left knee pain     MEDICAL HISTORY OF - 2005,2014    colonoscopy- due 2019      Past Surgical History:   Procedure Laterality Date     BIOPSY  01/01/2011    Skin biopsy/mole on back removed.     BIOPSY      Skin cancer     COLONOSCOPY       COLONOSCOPY  02/24/2014    Procedure: COLONOSCOPY;  Colonoscopy, screening;  Surgeon: Alanis Piña MD;  Location: [facility]     COLONOSCOPY WITH CO2 INSUFFLATION N/A 04/08/2019    Procedure: COLONOSCOPY WITH CO2 INSUFFLATION;  Surgeon: Bj Stephens MD;  Location: [facility]     GI SURGERY  4/8/2019     Review of Systems   Constitutional: Negative for chills and fever.   HENT: Negative for congestion, ear pain, hearing loss and sore throat.    Eyes: Positive for visual disturbance. Negative for pain.   Respiratory: Negative for cough and shortness of breath.    Cardiovascular: Negative for chest pain, palpitations and peripheral edema.   Gastrointestinal: Negative for abdominal pain, constipation, diarrhea, heartburn, hematochezia and nausea.   Genitourinary: Negative for dysuria, frequency, genital sores, hematuria, penile discharge and urgency.   Musculoskeletal: Positive for joint swelling. Negative for arthralgias and myalgias.   Skin: Negative for rash.   Neurological: Negative for dizziness, weakness, headaches and paresthesias.   Psychiatric/Behavioral: Negative for mood changes. The patient is not nervous/anxious.      OBJECTIVE:   BP (!) 154/90   Pulse 57   Temp 97.6  F (36.4  C) (Temporal)   Resp 11   Ht 1.835 m (6' 0.24\")   Wt 93 kg (205 lb)   SpO2 99%   BMI 27.62 kg/m      Physical Exam  GENERAL: healthy, alert and no distress  EYES: Eyes grossly normal to inspection, PERRL and conjunctivae and sclerae normal  HENT: ear canals and TM's normal, nose and mouth without ulcers or " lesions  NECK: no adenopathy, no asymmetry, masses, or scars and thyroid normal to palpation  RESP: lungs clear to auscultation - no rales, rhonchi or wheezes  CV: regular rate and rhythm, normal S1 S2, no S3 or S4, no murmur, click or rub, no peripheral edema and peripheral pulses strong  ABDOMEN: soft, nontender, no hepatosplenomegaly, no masses and bowel sounds normal  MS: no gross musculoskeletal defects noted, no edema  SKIN: no suspicious lesions or rashes  NEURO: Normal strength and tone, mentation intact and speech normal  PSYCH: mentation appears normal, affect normal/bright    Labs: Pending    ASSESSMENT/PLAN:   1. Routine general medical examination at a health care facility: Discussed personal health and safety.  Routine screenings for diabetes and cholesterol.  Declines PSA screening.  History of skin cancer.  Follow-up with dermatology.  Up-to-date on colonoscopies.  Declines COVID vaccination.  Encouraged checking with insurance in regards to shingles vaccine.  Up-to-date on tetanus.  - REVIEW OF HEALTH MAINTENANCE PROTOCOL ORDERS  - Lipid panel reflex to direct LDL Non-fasting; Future  - Basic metabolic panel  (Ca, Cl, CO2, Creat, Gluc, K, Na, BUN); Future    2. Elevated blood pressure reading without diagnosis of hypertension: Elevated blood pressure today in the prior visit.  Previously normal prior to that however.  Recommend patient obtain a home blood pressure cuff and recheck every other day for 2 weeks and message me on [employer] with these results.  BMP as below.  If consistently elevated would recommend treatment with first-line antihypertensive agent.    3. Pain in joint, ankle and foot, left: No current pain.  Some instability on anterior drawer test noted compared to right.  Would offer podiatry referral for further evaluation and determination if any additional steps are required.  Unlikely given lack of pain.  Discussed other conservative cares.  - Orthopedic  Referral;  "Future    4. History of melanoma in situ: Follow with dermatology    5. Lipid screening:   - Lipid panel reflex to direct LDL Non-fasting; Future    6. Screening for diabetes mellitus:  - Basic metabolic panel  (Ca, Cl, CO2, Creat, Gluc, K, Na, BUN); Future    7. Prostate cancer screening declined:     8. COVID-19 vaccination declined:     COUNSELING:   Reviewed preventive health counseling, as reflected in patient instructions       Regular exercise       Healthy diet/nutrition       Vision screening       Hearing screening       Colorectal cancer screening       Prostate cancer screening    Estimated body mass index is 27.62 kg/m  as calculated from the following:    Height as of this encounter: 1.835 m (6' 0.24\").    Weight as of this encounter: 93 kg (205 lb).     Weight management plan: Discussed healthy diet and exercise guidelines    He reports that he has never smoked. He has never used smokeless tobacco.      Counseling Resources:  ATP IV Guidelines  Pooled Cohorts Equation Calculator  FRAX Risk Assessment  ICSI Preventive Guidelines  Dietary Guidelines for Americans, 2010  USDA's MyPlate  ASA Prophylaxis  Lung CA Screening    Milan Aviles MD  [facility]     This chart is completed utilizing dictation software; typos and/or incorrect word substitutions may unintentionally occur.    "

## 2022-07-28 LAB
ANION GAP SERPL CALCULATED.3IONS-SCNC: 5 MMOL/L (ref 3–14)
BUN SERPL-MCNC: 16 MG/DL (ref 7–30)
CALCIUM SERPL-MCNC: 9.4 MG/DL (ref 8.5–10.1)
CHLORIDE BLD-SCNC: 105 MMOL/L (ref 94–109)
CHOLEST SERPL-MCNC: 252 MG/DL
CO2 SERPL-SCNC: 27 MMOL/L (ref 20–32)
CREAT SERPL-MCNC: 0.83 MG/DL (ref 0.66–1.25)
FASTING STATUS PATIENT QL REPORTED: NO
GFR SERPL CREATININE-BSD FRML MDRD: >90 ML/MIN/1.73M2
GLUCOSE BLD-MCNC: 98 MG/DL (ref 70–99)
HDLC SERPL-MCNC: 106 MG/DL
LDLC SERPL CALC-MCNC: 133 MG/DL
NONHDLC SERPL-MCNC: 146 MG/DL
POTASSIUM BLD-SCNC: 4.1 MMOL/L (ref 3.4–5.3)
SODIUM SERPL-SCNC: 137 MMOL/L (ref 133–144)
TRIGL SERPL-MCNC: 66 MG/DL

## 2022-07-28 NOTE — RESULT ENCOUNTER NOTE
"Please inform of results if patient has not viewed in [employer] within 3 business days.    Lipids - Your \"bad\" cholesterol was elevated. This can be improved with diet and exercise. All animal based foods such as meat, dairy, and eggs contain cholesterol. All plant based foods such as fruits, nuts, and vegetables do not.    You do not need a medication for this at this time.    BMP - Your blood sugar was 98. Your kidney function and electrolytes were normal.    Please call the clinic with any questions you may have.     Have a great day,    Dr. Weber    The ASCVD Risk score (Amaraandree REDMAN Jr., et al., 2013) failed to calculate for the following reasons:    The valid HDL cholesterol range is 20 to 100 mg/dL"

## 2022-08-01 ENCOUNTER — MYC MEDICAL ADVICE (OUTPATIENT)
Dept: FAMILY MEDICINE | Facility: CLINIC | Age: 52
End: 2022-08-01

## 2022-08-02 NOTE — TELEPHONE ENCOUNTER
CONG to provider: See Aloqa message;  He sent you his BP reading.  As per the 7/27/22 OV note:    2. Elevated blood pressure reading without diagnosis of hypertension: Elevated blood pressure today in the prior visit.  Previously normal prior to that however.  Recommend patient obtain a home blood pressure cuff and recheck every other day for 2 weeks and message me on [employer] with these results.  BMP as below.  If consistently elevated would recommend treatment with first-line antihypertensive agent.    Agnieszka Soliman RN

## 2022-08-29 ENCOUNTER — MYC MEDICAL ADVICE (OUTPATIENT)
Dept: FAMILY MEDICINE | Facility: CLINIC | Age: 52
End: 2022-08-29

## 2023-04-22 ENCOUNTER — HEALTH MAINTENANCE LETTER (OUTPATIENT)
Age: 53
End: 2023-04-22

## 2023-09-23 ENCOUNTER — HEALTH MAINTENANCE LETTER (OUTPATIENT)
Age: 53
End: 2023-09-23

## 2023-12-05 ENCOUNTER — TELEPHONE (OUTPATIENT)
Dept: DERMATOLOGY | Facility: CLINIC | Age: 53
End: 2023-12-05
Payer: COMMERCIAL

## 2023-12-05 NOTE — TELEPHONE ENCOUNTER
[facility] Call Center    Phone Message    May a detailed message be left on voicemail: yes     Reason for Call: Other: Pt states he has a Hx of skin cancer and would like to see Dr. Coello for a suspicious growth on his head. Pt has seen Dr. Coello several times in the past. Please call Pt back to discuss. Thank you.      Action Taken: Message routed to:  Adult Clinics: Dermatology p [phone]    Travel Screening: Not Applicable

## 2023-12-06 NOTE — TELEPHONE ENCOUNTER
Pt called and scheduled for next available opening. Pt instructed to see primary or another dermatologist if he feels this is urgent. Pt hasn't been seen in over a year and was supposed to follow up sooner.    Elaine May RN on 12/6/2023 at 3:21 PM

## 2023-12-19 ENCOUNTER — APPOINTMENT (OUTPATIENT)
Dept: URBAN - METROPOLITAN AREA CLINIC 259 | Age: 53
Setting detail: DERMATOLOGY
End: 2023-12-19

## 2023-12-19 DIAGNOSIS — D22 MELANOCYTIC NEVI: ICD-10-CM

## 2023-12-19 DIAGNOSIS — L82.0 INFLAMED SEBORRHEIC KERATOSIS: ICD-10-CM

## 2023-12-19 DIAGNOSIS — D49.2 NEOPLASM OF UNSPECIFIED BEHAVIOR OF BONE, SOFT TISSUE, AND SKIN: ICD-10-CM

## 2023-12-19 PROBLEM — D22.39 MELANOCYTIC NEVI OF OTHER PARTS OF FACE: Status: ACTIVE | Noted: 2023-12-19

## 2023-12-19 PROCEDURE — OTHER OBSERVATION: OTHER

## 2023-12-19 PROCEDURE — 11102 TANGNTL BX SKIN SINGLE LES: CPT | Mod: 59

## 2023-12-19 PROCEDURE — 99202 OFFICE O/P NEW SF 15 MIN: CPT | Mod: 25

## 2023-12-19 PROCEDURE — OTHER LIQUID NITROGEN: OTHER

## 2023-12-19 PROCEDURE — OTHER MIPS QUALITY: OTHER

## 2023-12-19 PROCEDURE — 17110 DESTRUCT B9 LESION 1-14: CPT

## 2023-12-19 PROCEDURE — OTHER BIOPSY BY SHAVE METHOD: OTHER

## 2023-12-19 PROCEDURE — OTHER COUNSELING: OTHER

## 2023-12-19 ASSESSMENT — LOCATION DETAILED DESCRIPTION DERM
LOCATION DETAILED: LEFT FOREHEAD
LOCATION DETAILED: LEFT NASAL ROOT
LOCATION DETAILED: LEFT NASAL ALA

## 2023-12-19 ASSESSMENT — LOCATION SIMPLE DESCRIPTION DERM
LOCATION SIMPLE: LEFT FOREHEAD
LOCATION SIMPLE: NOSE
LOCATION SIMPLE: LEFT NOSE

## 2023-12-19 ASSESSMENT — LOCATION ZONE DERM
LOCATION ZONE: NOSE
LOCATION ZONE: FACE

## 2023-12-19 NOTE — PROCEDURE: LIQUID NITROGEN
Show Topical Anesthesia Variable?: Yes
Detail Level: Detailed
Render Note In Bullet Format When Appropriate: No
Consent: The patient's consent was obtained including but not limited to risks of crusting, scabbing, blistering, scarring, darker or lighter pigmentary change, recurrence, incomplete removal and infection.
Post-Care Instructions: I reviewed with the patient in detail post-care instructions. Patient is to wear sunprotection, and avoid picking at any of the treated lesions. Pt may apply Vaseline to crusted or scabbing areas.
Medical Necessity Clause: This procedure was medically necessary because the lesions that were treated were:
Duration Of Freeze Thaw-Cycle (Seconds): 5-10
Number Of Freeze-Thaw Cycles: 2 freeze-thaw cycles
Application Tool (Optional): Liquid Nitrogen Sprayer
Medical Necessity Information: It is in your best interest to select a reason for this procedure from the list below. All of these items fulfill various CMS LCD requirements except the new and changing color options.
Spray Paint Text: The liquid nitrogen was applied to the skin utilizing a spray paint frosting technique.
3-5x/week

## 2023-12-19 NOTE — PROCEDURE: BIOPSY BY SHAVE METHOD
Detail Level: Detailed
Depth Of Biopsy: dermis
Was A Bandage Applied: Yes
Size Of Lesion In Cm: 0
Biopsy Type: H and E
Biopsy Method: 15 blade
Anesthesia Type: 1% lidocaine with epinephrine and a 1:10 solution of 8.4% sodium bicarbonate
Anesthesia Volume In Cc: 0.3
Hemostasis: Aluminum Chloride
Wound Care: Petrolatum
Dressing: bandage
Destruction After The Procedure: No
Type Of Destruction Used: Curettage
Curettage Text: The wound bed was treated with curettage after the biopsy was performed.
Cryotherapy Text: The wound bed was treated with cryotherapy after the biopsy was performed.
Electrodesiccation Text: The wound bed was treated with electrodesiccation after the biopsy was performed.
Electrodesiccation And Curettage Text: The wound bed was treated with electrodesiccation and curettage after the biopsy was performed.
Silver Nitrate Text: The wound bed was treated with silver nitrate after the biopsy was performed.
Lab: -[id number]
Consent: Written consent was obtained and risks were reviewed including but not limited to scarring, infection, bleeding, scabbing, incomplete removal, nerve damage and allergy to anesthesia.
Post-Care Instructions: I reviewed with the patient in detail post-care instructions. Patient is to keep the biopsy site dry overnight, and then apply bacitracin twice daily until healed. Patient may apply hydrogen peroxide soaks to remove any crusting.
Notification Instructions: Patient will be notified of biopsy results. However, patient instructed to call the office if not contacted within 2 weeks.
Billing Type: Third-Party Bill
Information: Selecting Yes will display possible errors in your note based on the variables you have selected. This validation is only offered as a suggestion for you. PLEASE NOTE THAT THE VALIDATION TEXT WILL BE REMOVED WHEN YOU FINALIZE YOUR NOTE. IF YOU WANT TO FAX A PRELIMINARY NOTE YOU WILL NEED TO TOGGLE THIS TO 'NO' IF YOU DO NOT WANT IT IN YOUR FAXED NOTE.

## 2023-12-19 NOTE — HPI: SKIN LESION
What Type Of Note Output Would You Prefer (Optional)?: Standard Output
How Severe Is Your Skin Lesion?: mild
Has Your Skin Lesion Been Treated?: not been treated
Is This A New Presentation, Or A Follow-Up?: Skin Lesions
Additional History: Patient presents for evaluation and management.

## 2024-01-09 ENCOUNTER — APPOINTMENT (OUTPATIENT)
Dept: URBAN - METROPOLITAN AREA CLINIC 259 | Age: 54
Setting detail: DERMATOLOGY
End: 2024-01-10

## 2024-01-09 DIAGNOSIS — L57.0 ACTINIC KERATOSIS: ICD-10-CM

## 2024-01-09 DIAGNOSIS — D18.0 HEMANGIOMA: ICD-10-CM

## 2024-01-09 DIAGNOSIS — Z71.89 OTHER SPECIFIED COUNSELING: ICD-10-CM

## 2024-01-09 DIAGNOSIS — L57.8 OTHER SKIN CHANGES DUE TO CHRONIC EXPOSURE TO NONIONIZING RADIATION: ICD-10-CM

## 2024-01-09 DIAGNOSIS — D22 MELANOCYTIC NEVI: ICD-10-CM

## 2024-01-09 DIAGNOSIS — Z85.820 PERSONAL HISTORY OF MALIGNANT MELANOMA OF SKIN: ICD-10-CM

## 2024-01-09 DIAGNOSIS — L72.8 OTHER FOLLICULAR CYSTS OF THE SKIN AND SUBCUTANEOUS TISSUE: ICD-10-CM

## 2024-01-09 DIAGNOSIS — Z85.828 PERSONAL HISTORY OF OTHER MALIGNANT NEOPLASM OF SKIN: ICD-10-CM

## 2024-01-09 DIAGNOSIS — L82.1 OTHER SEBORRHEIC KERATOSIS: ICD-10-CM

## 2024-01-09 DIAGNOSIS — L81.4 OTHER MELANIN HYPERPIGMENTATION: ICD-10-CM

## 2024-01-09 PROBLEM — D22.5 MELANOCYTIC NEVI OF TRUNK: Status: ACTIVE | Noted: 2024-01-09

## 2024-01-09 PROBLEM — D23.72 OTHER BENIGN NEOPLASM OF SKIN OF LEFT LOWER LIMB, INCLUDING HIP: Status: ACTIVE | Noted: 2024-01-09

## 2024-01-09 PROBLEM — D18.01 HEMANGIOMA OF SKIN AND SUBCUTANEOUS TISSUE: Status: ACTIVE | Noted: 2024-01-09

## 2024-01-09 PROCEDURE — OTHER COUNSELING: OTHER

## 2024-01-09 PROCEDURE — OTHER LIQUID NITROGEN: OTHER

## 2024-01-09 PROCEDURE — OTHER MIPS QUALITY: OTHER

## 2024-01-09 PROCEDURE — 17000 DESTRUCT PREMALG LESION: CPT

## 2024-01-09 PROCEDURE — 99213 OFFICE O/P EST LOW 20 MIN: CPT | Mod: 25

## 2024-01-09 ASSESSMENT — LOCATION ZONE DERM
LOCATION ZONE: TRUNK
LOCATION ZONE: EAR

## 2024-01-09 ASSESSMENT — LOCATION SIMPLE DESCRIPTION DERM
LOCATION SIMPLE: RIGHT UPPER BACK
LOCATION SIMPLE: LEFT UPPER BACK
LOCATION SIMPLE: RIGHT EAR
LOCATION SIMPLE: LEFT EAR

## 2024-01-09 ASSESSMENT — LOCATION DETAILED DESCRIPTION DERM
LOCATION DETAILED: LEFT SCAPHA
LOCATION DETAILED: RIGHT SUPERIOR CRUS OF ANTIHELIX
LOCATION DETAILED: LEFT MEDIAL UPPER BACK
LOCATION DETAILED: LEFT SUPERIOR UPPER BACK
LOCATION DETAILED: RIGHT SUPERIOR UPPER BACK
LOCATION DETAILED: LEFT SUPERIOR MEDIAL UPPER BACK

## 2024-01-09 NOTE — HPI: FULL BODY SKIN EXAMINATION
How Severe Are Your Spot(S)?: mild
What Type Of Note Output Would You Prefer (Optional)?: Standard Output
What Is The Reason For Today's Visit?: Full Body Skin Examination
What Is The Reason For Today's Visit? (Being Monitored For X): concerning skin lesions on a periodic basis
Additional History: Pt states he noticed a couple spots on his face that he would like checked.

## 2024-01-09 NOTE — PROCEDURE: LIQUID NITROGEN
Detail Level: Detailed
Post-Care Instructions: I reviewed with the patient in detail post-care instructions. Patient is to wear sunprotection, and avoid picking at any of the treated lesions. Pt may apply Vaseline to crusted or scabbing areas.
Number Of Freeze-Thaw Cycles: 2 freeze-thaw cycles
Duration Of Freeze Thaw-Cycle (Seconds): 3
Render Post-Care Instructions In Note?: yes
Consent: The patient's consent was obtained including but not limited to risks of crusting, scabbing, blistering, scarring, darker or lighter pigmentary change, recurrence, incomplete removal and infection.
Application Tool (Optional): Liquid Nitrogen Sprayer
Render Note In Bullet Format When Appropriate: No

## 2024-02-28 SDOH — HEALTH STABILITY: PHYSICAL HEALTH: ON AVERAGE, HOW MANY MINUTES DO YOU ENGAGE IN EXERCISE AT THIS LEVEL?: 60 MIN

## 2024-02-28 SDOH — HEALTH STABILITY: PHYSICAL HEALTH: ON AVERAGE, HOW MANY DAYS PER WEEK DO YOU ENGAGE IN MODERATE TO STRENUOUS EXERCISE (LIKE A BRISK WALK)?: 4 DAYS

## 2024-02-28 ASSESSMENT — SOCIAL DETERMINANTS OF HEALTH (SDOH): HOW OFTEN DO YOU GET TOGETHER WITH FRIENDS OR RELATIVES?: ONCE A WEEK

## 2024-03-01 ENCOUNTER — OFFICE VISIT (OUTPATIENT)
Dept: FAMILY MEDICINE | Facility: CLINIC | Age: 54
End: 2024-03-01
Payer: COMMERCIAL

## 2024-03-01 VITALS
WEIGHT: 234.5 LBS | SYSTOLIC BLOOD PRESSURE: 128 MMHG | TEMPERATURE: 97.7 F | DIASTOLIC BLOOD PRESSURE: 92 MMHG | OXYGEN SATURATION: 98 % | BODY MASS INDEX: 31.76 KG/M2 | RESPIRATION RATE: 16 BRPM | HEART RATE: 74 BPM | HEIGHT: 72 IN

## 2024-03-01 DIAGNOSIS — Z13.220 LIPID SCREENING: ICD-10-CM

## 2024-03-01 DIAGNOSIS — Z13.1 SCREENING FOR DIABETES MELLITUS: ICD-10-CM

## 2024-03-01 DIAGNOSIS — Z13.0 SCREENING FOR DEFICIENCY ANEMIA: ICD-10-CM

## 2024-03-01 DIAGNOSIS — Z00.00 ROUTINE GENERAL MEDICAL EXAMINATION AT A HEALTH CARE FACILITY: Primary | ICD-10-CM

## 2024-03-01 DIAGNOSIS — Z12.11 SCREENING FOR COLON CANCER: ICD-10-CM

## 2024-03-01 DIAGNOSIS — Z86.006 HISTORY OF MELANOMA IN SITU: ICD-10-CM

## 2024-03-01 DIAGNOSIS — I10 BENIGN ESSENTIAL HYPERTENSION: ICD-10-CM

## 2024-03-01 LAB
ERYTHROCYTE [DISTWIDTH] IN BLOOD BY AUTOMATED COUNT: 11.7 % (ref 10–15)
HCT VFR BLD AUTO: 46.8 % (ref 40–53)
HGB BLD-MCNC: 15.9 G/DL (ref 13.3–17.7)
MCH RBC QN AUTO: 29.9 PG (ref 26.5–33)
MCHC RBC AUTO-ENTMCNC: 34 G/DL (ref 31.5–36.5)
MCV RBC AUTO: 88 FL (ref 78–100)
PLATELET # BLD AUTO: 201 10E3/UL (ref 150–450)
RBC # BLD AUTO: 5.32 10E6/UL (ref 4.4–5.9)
WBC # BLD AUTO: 8 10E3/UL (ref 4–11)

## 2024-03-01 PROCEDURE — 99396 PREV VISIT EST AGE 40-64: CPT | Performed by: FAMILY MEDICINE

## 2024-03-01 PROCEDURE — 36415 COLL VENOUS BLD VENIPUNCTURE: CPT | Performed by: FAMILY MEDICINE

## 2024-03-01 PROCEDURE — 80061 LIPID PANEL: CPT | Performed by: FAMILY MEDICINE

## 2024-03-01 PROCEDURE — 80053 COMPREHEN METABOLIC PANEL: CPT | Performed by: FAMILY MEDICINE

## 2024-03-01 PROCEDURE — 99214 OFFICE O/P EST MOD 30 MIN: CPT | Mod: 25 | Performed by: FAMILY MEDICINE

## 2024-03-01 PROCEDURE — 85027 COMPLETE CBC AUTOMATED: CPT | Performed by: FAMILY MEDICINE

## 2024-03-01 RX ORDER — LOSARTAN POTASSIUM 50 MG/1
50 TABLET ORAL DAILY
Qty: 90 TABLET | Refills: 0 | Status: SHIPPED | OUTPATIENT
Start: 2024-03-01 | End: 2024-03-22

## 2024-03-01 ASSESSMENT — PAIN SCALES - GENERAL: PAINLEVEL: NO PAIN (0)

## 2024-03-01 NOTE — LETTER
March 7, 2024      Aubrey C[place]  [address]  [place] [address]        Dear ,    We are writing to inform you of your test results.    {results letter list:[id number]}    No results found from the In Basket message.    If you have any questions or concerns, please call the clinic at the number listed above.       Sincerely,

## 2024-03-01 NOTE — RESULT ENCOUNTER NOTE
Sandro Burgos,    If you have not viewed these results on [employer] within 3 days, we will use an alternative method to contact you. We will contact you via the following protocol:    - Via letter if your results are normal.  - Via phone ([phone]) if your results are abnormal.     Here are my comments about your recent results:    CBC Results - Your cell counts were normal.    Please call the clinic ([phone]), or message us on [employer] with any questions you may have.     Have a great day,    Dr. Weber

## 2024-03-01 NOTE — PROGRESS NOTES
Preventive Care Visit  [facility] LORENZO Aviles MD, Family Medicine  Mar 1, 2024    Assessment & Plan   1. Routine general medical examination at a health care facility  Discussed personal health and safety. Routine screenings ordered as below. Appropriate anticipatory guidance, vaccinations, and health screening recommendations delivered according to the USPSTF and other appropriate society guidelines. Aubrey reports understanding and in agreement with this mutually agreed upon.    Today's Orders:  - REVIEW OF HEALTH MAINTENANCE PROTOCOL ORDERS  - Lipid panel reflex to direct LDL Non-fasting; Future  - CBC with platelets; Future  - Comprehensive metabolic panel; Future  - Colonoscopy Screening  Referral; Future    2. Benign essential hypertension  A comprehensive plan was outlined to address Aubrey's hypertension. Discussed importance of blood pressure control to prevent heart disease, stroke, kidney disease, etc. Discussed goal blood pressure < 140/90.  Recent blood pressures shown below:     BP Readings from Last 3 Encounters:   03/01/24 (!) 128/92   07/27/22 (!) 154/90   11/02/20 (!) 157/104     Emphasis was placed on lifestyle modifications as first line therapy to treat his hypertension. This included diet changes (DASH diet), regular exercise (30 minutes, 5x weekly), improved sleep hygiene, decreasing stress, decreasing caffeine and alcohol consumption, and weight loss as nonpharmacological modifications.       Given persistent elevation, a mutual decision was made to start pharmacologic therapy. We had a detailed discussion of the medication mechanism of action, monitoring parameters, and common side effects of each of the first line agents as outlined by the JNC8 (ACEs, ARBs, CCB's, and Thiazide Diuretics).      Following this discussion, Aubrey elected to start the medication ordered below. I encouraged him to purchase a home blood pressure cuff to check his blood pressure  "twice daily (morning and night, 2 readings each time 1-2 minutes apart) for the next 14 days, after which time they should contact my office via phone, or [employer] to report these numbers to me.      In addition, a follow-up \"Nursing Visit\" and/or \"Lab Only\" visit was recommended in 2 weeks to recheck an office blood pressure, and obtain monitoring labs if indicated (ACEs/ARBs/Thiazide Diuretics). Will titrate to achieve desired goal listed above. Patient will contact me with any side effects/intolerances, or other concerns.       Discussed monitoring his blood pressure weekly once controlled (2 readings each time 1-2 minutes apart), and scheduling a follow-up visit with me if consistently above goal.      Discussed subsequent monitoring in office once controlled during their annual physical exam, or 2 weeks after any future blood pressure medication change.      The patient agreed with the proposed plan, demonstrating an understanding of the importance of medication adherence, disease monitoring, and lifestyle changes. This integrated approach aims to optimize blood pressure management while ensuring Aubrey's overall cardiovascular health.     Today's Orders:  - losartan (COZAAR) 50 MG tablet; Take 1 tablet (50 mg) by mouth daily  Dispense: 90 tablet; Refill: 0  - Basic metabolic panel; Future  - PRIMARY CARE FOLLOW-UP SCHEDULING; Future  - PRIMARY CARE FOLLOW-UP SCHEDULING; Future    3. Lipid screening  - Lipid panel reflex to direct LDL Non-fasting; Future    4. Screening for diabetes mellitus  - Comprehensive metabolic panel; Future    5. Screening for deficiency anemia  - CBC with platelets; Future    6. Screening for colon cancer  - Colonoscopy Screening  Referral; Future    7. History of melanoma in situ  Follow-up with dermatology.     Follow-up Visit   Expected date:  Mar 15, 2024 (Approximate)      Follow Up Appointment Details:     Follow-up with whom?: Other Primary Care Services    Follow-Up for " what?: Clinic Staff Visit (MA, LPN, VF)    How?: In Person    Is this an as-needed follow-up?: No             Follow-up Visit   Expected date:  Mar 15, 2024 (Approximate)      Follow Up Appointment Details:     Follow-up with whom?: Other Primary Care Services    Follow-Up for what?: Lab Visit    How?: In Person    Is this an as-needed follow-up?: No             Follow-up Visit   Expected date:  Mar 01, 2025 (Approximate)      Follow Up Appointment Details:     Follow-up with whom?: PCP    Follow-Up for what?: Adult Preventive    How?: In Person                   Milan Aviles MD  [facility]    Disclaimer: This note consists of symbols derived from keyboarding, dictation and/or voice recognition software. As a result, there may be errors in the script that have gone undetected. Please consider this when interpreting information found in this chart.    Willy Burgos is a 53 year old, presenting for the following:  Physical        3/1/2024     2:49 PM   Additional Questions   Roomed by YK   Accompanied by self        Health Care Directive  Patient does not have a Health Care Directive or Living Will: Discussed advance care planning with patient; however, patient declined at this time.    HPI      2/28/2024   General Health   How would you rate your overall physical health? Excellent   Feel stress (tense, anxious, or unable to sleep) Only a little   (!) STRESS CONCERN      2/28/2024   Nutrition   Three or more servings of calcium each day? Yes   Diet: Carbohydrate counting   How many servings of fruit and vegetables per day? (!) 2-3   How many sweetened beverages each day? 0-1         2/28/2024   Exercise   Days per week of moderate/strenous exercise 4 days   Average minutes spent exercising at this level 60 min         2/28/2024   Social Factors   Frequency of gathering with friends or relatives Once a week   Worry food won't last until get money to buy more No   Food not  last or not have enough money for food? No   Do you have housing?  Yes   Are you worried about losing your housing? No   Lack of transportation? No   Unable to get utilities (heat,electricity)? No         2/28/2024   Fall Risk   Fallen 2 or more times in the past year? No   Trouble with walking or balance? No          2/28/2024   Dental   Dentist two times every year? Yes         2/28/2024   TB Screening   Were you born outside of [place]?  No         Today's PHQ-2 Score:       2/29/2024     3:48 PM   PHQ-2 ( 1999 [employer])   Q1: Little interest or pleasure in doing things 0   Q2: Feeling down, depressed or hopeless 0   PHQ-2 Score 0   Q1: Little interest or pleasure in doing things Not at all   Q2: Feeling down, depressed or hopeless Not at all   PHQ-2 Score 0           2/28/2024   Substance Use   Alcohol more than 3/day or more than 7/wk No   Do you use any other substances recreationally? No     Social History     Tobacco Use     Smoking status: Never     Smokeless tobacco: Never   Vaping Use     Vaping Use: Never used   Substance Use Topics     Alcohol use: Yes     Comment: 2-3 drinks per week      Drug use: No           2/28/2024   STI Screening   New sexual partner(s) since last STI/HIV test? No   Last PSA:   PSA   Date Value Ref Range Status   11/05/2019 0.74 0 - 4 ug/L Final     Comment:     Assay Method:  Chemiluminescence using Siemens Vista analyzer     ASCVD Risk   The ASCVD Risk score (Gardenia MARIEE, et al., 2019) failed to calculate for the following reasons:    The valid HDL cholesterol range is 20 to 100 mg/dL    Reviewed and updated as needed this visit by Provider   Tobacco  Allergies  Meds  Problems  Med Hx  Surg Hx  Fam Hx        Social Hx Reviewed    Past Medical History:   Diagnosis Date     Cancer (H)     Skin     Cancer (H)     melanoma- back     Lumbago 01/01/2003     MEDICAL HISTORY OF - 01/01/2003    left knee pain     MEDICAL HISTORY OF - 2005,2014    colonoscopy- due 2019     Past  "Surgical History:   Procedure Laterality Date     BIOPSY  01/01/2011    Skin biopsy/mole on back removed.     BIOPSY      Skin cancer     COLONOSCOPY       COLONOSCOPY  02/24/2014    Procedure: COLONOSCOPY;  Colonoscopy, screening;  Surgeon: Alanis Piña MD;  Location: [facility] OR     COLONOSCOPY WITH CO2 INSUFFLATION N/A 04/08/2019    Procedure: COLONOSCOPY WITH CO2 INSUFFLATION;  Surgeon: Bj Stephens MD;  Location: [facility] OR     GI SURGERY  4/8/2019         Review of Systems  Constitutional, HEENT, cardiovascular, pulmonary, GI, , musculoskeletal, neuro, skin, endocrine and psych systems are negative, except as otherwise noted.     Objective    Exam  BP (!) 128/92   Pulse 74   Temp 97.7  F (36.5  C) (Temporal)   Resp 16   Ht 1.833 m (6' 0.17\")   Wt 106.4 kg (234 lb 8 oz)   SpO2 98%   BMI 31.66 kg/m     Estimated body mass index is 31.66 kg/m  as calculated from the following:    Height as of this encounter: 1.833 m (6' 0.17\").    Weight as of this encounter: 106.4 kg (234 lb 8 oz).    Physical Exam  HENT:      Head: Normocephalic and atraumatic.      Right Ear: Tympanic membrane, ear canal and external ear normal. There is no impacted cerumen.      Left Ear: Tympanic membrane, ear canal and external ear normal. There is no impacted cerumen.      Nose: Nose normal. No congestion.      Mouth/Throat:      Pharynx: Oropharynx is clear. No oropharyngeal exudate or posterior oropharyngeal erythema.   Eyes:      General:         Right eye: No discharge.         Left eye: No discharge.      Extraocular Movements: Extraocular movements intact.      Conjunctiva/sclera: Conjunctivae normal.      Pupils: Pupils are equal, round, and reactive to light.   Cardiovascular:      Rate and Rhythm: Normal rate and regular rhythm.      Heart sounds: Normal heart sounds. No murmur heard.     No friction rub.   Pulmonary:      Effort: Pulmonary effort is normal. No respiratory distress.      Breath sounds: Normal " breath sounds. No wheezing or rhonchi.   Abdominal:      General: Abdomen is flat. There is no distension.      Palpations: Abdomen is soft. There is no mass.      Tenderness: There is no abdominal tenderness. There is no guarding.   Musculoskeletal:         General: No swelling.      Cervical back: Normal range of motion and neck supple. No tenderness.      Right lower leg: No edema.      Left lower leg: No edema.   Lymphadenopathy:      Cervical: No cervical adenopathy.   Skin:     General: Skin is warm.      Findings: No rash.   Neurological:      General: No focal deficit present.      Mental Status: He is alert.      Cranial Nerves: No cranial nerve deficit.      Motor: No weakness.      Coordination: Coordination normal.      Gait: Gait normal.   Psychiatric:         Mood and Affect: Mood normal.         Behavior: Behavior normal.         Thought Content: Thought content normal.         Judgment: Judgment normal.     Labs: Pending      Signed Electronically by: Milan Aviles MD

## 2024-03-01 NOTE — LETTER
"March 7, 2024    Aubrey Clement  [address] [place] [address]        Dear ,    We are writing to inform you of your test results.    Test results indicate you may require additional follow up, see comment below.    Lipids - Your \"bad\" cholesterol was elevated. This can be improved with diet and exercise. All animal based foods such as meat, dairy, and eggs contain cholesterol. All plant based foods such as fruits, nuts, and vegetables do not.     You do not need a medication for this at this time.     CMP Results - Your blood sugar was normal. Your kidney function, electrolytes, and liver function were normal. One test that was elevated was your BUN. This is a kidney marker, but very inaccurate and can be high if fasting or taking in large amounts of protein etc. Your other kidney tests were normal which is reassuring.      Sodium   Date Value Ref Range Status   03/01/2024 139 135 - 145 mmol/L Final     Comment:     Reference intervals for this test were updated on 09/26/2023 to more accurately reflect our healthy population. There may be differences in the flagging of prior results with similar values performed with this method. Interpretation of those prior results can be made in the context of the updated reference intervals.      Potassium   Date Value Ref Range Status   03/01/2024 4.2 3.4 - 5.3 mmol/L Final   07/27/2022 4.1 3.4 - 5.3 mmol/L Final     Chloride   Date Value Ref Range Status   03/01/2024 101 98 - 107 mmol/L Final   07/27/2022 105 94 - 109 mmol/L Final     Carbon Dioxide (CO2)   Date Value Ref Range Status   03/01/2024 26 22 - 29 mmol/L Final   07/27/2022 27 20 - 32 mmol/L Final     Anion Gap   Date Value Ref Range Status   03/01/2024 12 7 - 15 mmol/L Final   07/27/2022 5 3 - 14 mmol/L Final     Glucose   Date Value Ref Range Status   03/01/2024 96 70 - 99 mg/dL Final   07/27/2022 98 70 - 99 mg/dL Final   11/05/2019 83 70 - 99 mg/dL Final     Urea Nitrogen   Date Value Ref Range " Status   03/01/2024 28.0 (H) 6.0 - 20.0 mg/dL Final   07/27/2022 16 7 - 30 mg/dL Final     Creatinine   Date Value Ref Range Status   03/01/2024 1.14 0.67 - 1.17 mg/dL Final     GFR Estimate   Date Value Ref Range Status   03/01/2024 76 >60 mL/min/1.73m2 Final     Calcium   Date Value Ref Range Status   03/01/2024 9.4 8.6 - 10.0 mg/dL Final     Bilirubin Total   Date Value Ref Range Status   03/01/2024 0.5 <=1.2 mg/dL Final   01/02/2017 0.5 0.2 - 1.3 mg/dL Final     Alkaline Phosphatase   Date Value Ref Range Status   03/01/2024 50 40 - 150 U/L Final     Comment:     Reference intervals for this test were updated on 11/14/2023 to more accurately reflect our healthy population. There may be differences in the flagging of prior results with similar values performed with this method. Interpretation of those prior results can be made in the context of the updated reference intervals.   01/02/2017 43 40 - 150 U/L Final     ALT   Date Value Ref Range Status   03/01/2024 17 0 - 70 U/L Final     Comment:     Reference intervals for this test were updated on 6/12/2023 to more accurately reflect our healthy population. There may be differences in the flagging of prior results with similar values performed with this method. Interpretation of those prior results can be made in the context of the updated reference intervals.     01/02/2017 29 0 - 70 U/L Final     AST   Date Value Ref Range Status   03/01/2024 22 0 - 45 U/L Final     Comment:     Reference intervals for this test were updated on 6/12/2023 to more accurately reflect our healthy population. There may be differences in the flagging of prior results with similar values performed with this method. Interpretation of those prior results can be made in the context of the updated reference intervals.   01/02/2017 16 0 - 45 U/L Final         Cholesterol   Date Value Ref Range Status   03/01/2024 221 (H) <200 mg/dL Final   07/27/2022 252 (H) <200 mg/dL Final   11/05/2019  190 <200 mg/dL Final   10/04/2010 171 0 - 200 mg/dL Final     Comment:     LDL Cholesterol is the primary guide to therapy.   The NCEP recommends further evaluation of: patients with cholesterol <200   mg/dL   if additional risk factors are present, cholesterol >240 mg/dL, triglycerides   >150 mg/dL, or HDL <40 mg/dL.     HDL Cholesterol   Date Value Ref Range Status   11/05/2019 68 >39 mg/dL Final   10/04/2010 57 40 - 110 mg/dL Final     Direct Measure HDL   Date Value Ref Range Status   03/01/2024 66 >=40 mg/dL Final   07/27/2022 106 >=40 mg/dL Final     LDL Cholesterol Calculated   Date Value Ref Range Status   03/01/2024 144 (H) <=100 mg/dL Final   07/27/2022 133 (H) <=100 mg/dL Final   11/05/2019 106 (H) <100 mg/dL Final     Comment:     Above desirable:  100-129 mg/dl  Borderline High:  130-159 mg/dL  High:             160-189 mg/dL  Very high:       >189 mg/dl     10/04/2010 105 0 - 129 mg/dL Final     Comment:     LDL Cholesterol is the primary guide to therapy: LDL-cholesterol goal in high   risk patients is <100 mg/dL and in very high risk patients is <70 mg/dL.     LDL Cholesterol Direct   Date Value Ref Range Status   03/10/2014 106 0 - 129 mg/dL Final     Comment:     Optimal:         <100 mg/dL   Near Optimal:     100-129 mg/dL   Borderline High:  130-159 mg/dL   High:             160-189 mg/dL   Very high:  greater than or equal to 190 mg/dL   Cannot estimate LDL when triglyceride exceeds 400 mg/dL     Triglycerides   Date Value Ref Range Status   03/01/2024 57 <150 mg/dL Final   07/27/2022 66 <150 mg/dL Final   11/05/2019 80 <150 mg/dL Final   10/04/2010 47 0 - 150 mg/dL Final     Cholesterol/HDL Ratio   Date Value Ref Range Status   10/04/2010 3.0 0.0 - 5.0 Final        If you have any questions or concerns, please call the clinic at the number listed above.       Sincerely,    Milan Aviles MD

## 2024-03-01 NOTE — PATIENT INSTRUCTIONS
"Important Takeaway Points From This Visit:   Call your insurance about where to get the shingles vaccine if interested.     Regarding your Blood Pressure:  We care about controlling high blood pressure to decrease your risk of heart disease, stroke, and kidney disease.    We changed your medication list today. Please review this for further instructions and accuracy.    Your goal blood pressure is < 140/90.    Since we have made a change, I recommending you monitor your blood pressure twice daily (taking 2 readings each time, 1-2 minutes apart) with a home blood pressure cuff. You may need to purchase this if you do not already have one. Report these numbers to me by calling the clinic, or messaging on [employer] after 2 weeks.     If you are starting, or changing a medication: please schedule a follow up \"MA/Nursing\" visit to check your blood pressure and/or a \"Lab Only\" visit for follow-up monitoring labs in 2 weeks. You do not need to see me that day.    Once your blood pressure is under control, we will have you check your blood pressures once weekly (taking 2 readings each time, 1-2 minutes apart). Schedule a follow-up visit with me if your numbers are becoming consistently above our set blood pressure goal above.    If applicable, decrease your caffeine consumption.    Limit alcohol use.    Stay on a strict sleep schedule, going to bed at the same time, get up at the same time, and give yourself at least 8 hours.  If noticing snoring, daytime sleepiness, or episodes where you stop breathing in your sleep request a sleep study.    Work on weight loss/maintaining a healthy weight:  Follow the DASH diet. (https://www.nhlbi.nih.gov/education/dash-eating-plan)  Increase exercise to 30 minutes a day 5 days a week (150 minutes a week on average).    Some over the counter medications can increase blood pressures like:  Sudafed (pseudoephedrine) containing products (also called \"decongestants\")   NSAID medications like " ibuprofen (Advil and Motrin) and naproxen (Aleve or Naprosyn)              Preventive Care Advice   This is general advice given by our system to help you stay healthy. However, your care team may have specific advice just for you. Please talk to your care team about your preventive care needs.  Nutrition  Eat 5 or more servings of fruits and vegetables each day.  Try wheat bread, brown rice and whole grain pasta (instead of white bread, rice, and pasta).  Get enough calcium and vitamin D. Check the label on foods and aim for 100% of the RDA (recommended daily allowance).  Lifestyle  Exercise at least 150 minutes each week   (30 minutes a day, 5 days a week).  Do muscle strengthening activities 2 days a week. These help control your weight and prevent disease.  No smoking.  Wear sunscreen to prevent skin cancer.  Have a dental exam and cleaning every 6 months.  Yearly exams  See your health care team every year to talk about:  Any changes in your health.  Any medicines your care team has prescribed.  Preventive care, family planning, and ways to prevent chronic diseases.  Shots (vaccines)   HPV shots (up to age 26), if you've never had them before.  Hepatitis B shots (up to age 59), if you've never had them before.  COVID-19 shot: Get this shot when it's due.  Flu shot: Get a flu shot every year.  Tetanus shot: Get a tetanus shot every 10 years.  Pneumococcal, hepatitis A, and RSV shots: Ask your care team if you need these based on your risk.  Shingles shot (for age 50 and up).  General health tests  Diabetes screening:  Starting at age 35, Get screened for diabetes at least every 3 years.  If you are younger than age 35, ask your care team if you should be screened for diabetes.  Cholesterol test: At age 39, start having a cholesterol test every 5 years, or more often if advised.  Bone density scan (DEXA): At age 50, ask your care team if you should have this scan for osteoporosis (brittle bones).  Hepatitis C:  Get tested at least once in your life.  STIs (sexually transmitted infections)  Before age 24: Ask your care team if you should be screened for STIs.  After age 24: Get screened for STIs if you're at risk. You are at risk for STIs (including HIV) if:  You are sexually active with more than one person.  You don't use condoms every time.  You or a partner was diagnosed with a sexually transmitted infection.  If you are at risk for HIV, ask about PrEP medicine to prevent HIV.  Get tested for HIV at least once in your life, whether you are at risk for HIV or not.  Cancer screening tests  Cervical cancer screening: If you have a cervix, begin getting regular cervical cancer screening tests at age 21. Most people who have regular screenings with normal results can stop after age 65. Talk about this with your provider.  Breast cancer scan (mammogram): If you've ever had breasts, begin having regular mammograms starting at age 40. This is a scan to check for breast cancer.  Colon cancer screening: It is important to start screening for colon cancer at age 45.  Have a colonoscopy test every 10 years (or more often if you're at risk) Or, ask your provider about stool tests like a FIT test every year or Cologuard test every 3 years.  To learn more about your testing options, visit: https://www.FAD ? IO/448261.pdf.  For help making a decision, visit: https://bit.ly/pa58374.  Prostate cancer screening test: If you have a prostate and are age 55 to 69, ask your provider if you would benefit from a yearly prostate cancer screening test.  Lung cancer screening: If you are a current or former smoker age 50 to 80, ask your care team if ongoing lung cancer screenings are right for you.  For informational purposes only. Not to replace the advice of your health care provider. Copyright   2023 [employer]. All rights reserved. Clinically reviewed by the [facility]. [employer] [id number] - REV  01/24.    Learning About Stress  What is stress?     Stress is your body's response to a hard situation. Your body can have a physical, emotional, or mental response. Stress is a fact of life for most people, and it affects everyone differently. What causes stress for you may not be stressful for someone else.  A lot of things can cause stress. You may feel stress when you go on a job interview, take a test, or run a race. This kind of short-term stress is normal and even useful. It can help you if you need to work hard or react quickly. For example, stress can help you finish an important job on time.  Long-term stress is caused by ongoing stressful situations or events. Examples of long-term stress include long-term health problems, ongoing problems at work, or conflicts in your family. Long-term stress can harm your health.  How does stress affect your health?  When you are stressed, your body responds as though you are in danger. It makes hormones that speed up your heart, make you breathe faster, and give you a burst of energy. This is called the fight-or-flight stress response. If the stress is over quickly, your body goes back to normal and no harm is done.  But if stress happens too often or lasts too long, it can have bad effects. Long-term stress can make you more likely to get sick, and it can make symptoms of some diseases worse. If you tense up when you are stressed, you may develop neck, shoulder, or low back pain. Stress is linked to high blood pressure and heart disease.  Stress also harms your emotional health. It can make you snow, tense, or depressed. Your relationships may suffer, and you may not do well at work or school.  What can you do to manage stress?  You can try these things to help manage stress:   Do something active. Exercise or activity can help reduce stress. Walking is a great way to get started. Even everyday activities such as housecleaning or yard work can help.  Try yoga or sayda  chi. These techniques combine exercise and meditation. You may need some training at first to learn them.  Do something you enjoy. For example, listen to music or go to a movie. Practice your hobby or do volunteer work.  Meditate. This can help you relax, because you are not worrying about what happened before or what may happen in the future.  Do guided imagery. Imagine yourself in any setting that helps you feel calm. You can use online videos, books, or a teacher to guide you.  Do breathing exercises. For example:  From a standing position, bend forward from the waist with your knees slightly bent. Let your arms dangle close to the floor.  Breathe in slowly and deeply as you return to a standing position. Roll up slowly and lift your head last.  Hold your breath for just a few seconds in the standing position.  Breathe out slowly and bend forward from the waist.  Let your feelings out. Talk, laugh, cry, and express anger when you need to. Talking with supportive friends or family, a counselor, or a sheng leader about your feelings is a healthy way to relieve stress. Avoid discussing your feelings with people who make you feel worse.  Write. It may help to write about things that are bothering you. This helps you find out how much stress you feel and what is causing it. When you know this, you can find better ways to cope.  What can you do to prevent stress?  You might try some of these things to help prevent stress:  Manage your time. This helps you find time to do the things you want and need to do.  Get enough sleep. Your body recovers from the stresses of the day while you are sleeping.  Get support. Your family, friends, and community can make a difference in how you experience stress.  Limit your news feed. Avoid or limit time on social media or news that may make you feel stressed.  Do something active. Exercise or activity can help reduce stress. Walking is a great way to get started.  Where can you learn  "more?  Go to https://www.[employer].net/patiented  Enter N032 in the search box to learn more about \"Learning About Stress.\"  Current as of: February 26, 2023               Content Version: 13.8    0428-0020 [employer].   Care instructions adapted under license by your healthcare professional. If you have questions about a medical condition or this instruction, always ask your healthcare professional. [employer] disclaims any warranty or liability for your use of this information.      "

## 2024-03-04 LAB
ALBUMIN SERPL BCG-MCNC: 4.4 G/DL (ref 3.5–5.2)
ALP SERPL-CCNC: 50 U/L (ref 40–150)
ALT SERPL W P-5'-P-CCNC: 17 U/L (ref 0–70)
ANION GAP SERPL CALCULATED.3IONS-SCNC: 12 MMOL/L (ref 7–15)
AST SERPL W P-5'-P-CCNC: 22 U/L (ref 0–45)
BILIRUB SERPL-MCNC: 0.5 MG/DL
BUN SERPL-MCNC: 28 MG/DL (ref 6–20)
CALCIUM SERPL-MCNC: 9.4 MG/DL (ref 8.6–10)
CHLORIDE SERPL-SCNC: 101 MMOL/L (ref 98–107)
CHOLEST SERPL-MCNC: 221 MG/DL
CREAT SERPL-MCNC: 1.14 MG/DL (ref 0.67–1.17)
DEPRECATED HCO3 PLAS-SCNC: 26 MMOL/L (ref 22–29)
EGFRCR SERPLBLD CKD-EPI 2021: 76 ML/MIN/1.73M2
FASTING STATUS PATIENT QL REPORTED: ABNORMAL
GLUCOSE SERPL-MCNC: 96 MG/DL (ref 70–99)
HDLC SERPL-MCNC: 66 MG/DL
LDLC SERPL CALC-MCNC: 144 MG/DL
NONHDLC SERPL-MCNC: 155 MG/DL
POTASSIUM SERPL-SCNC: 4.2 MMOL/L (ref 3.4–5.3)
PROT SERPL-MCNC: 6.7 G/DL (ref 6.4–8.3)
SODIUM SERPL-SCNC: 139 MMOL/L (ref 135–145)
TRIGL SERPL-MCNC: 57 MG/DL

## 2024-03-05 NOTE — RESULT ENCOUNTER NOTE
"Sandro Burgos,    If you have not viewed these results on [employer] within 3 days, we will use an alternative method to contact you. We will contact you via the following protocol:    - Via letter if your results are normal.  - Via phone ([phone]) if your results are abnormal.     Here are my comments about your recent results:    Lipids - Your \"bad\" cholesterol was elevated. This can be improved with diet and exercise. All animal based foods such as meat, dairy, and eggs contain cholesterol. All plant based foods such as fruits, nuts, and vegetables do not.    You do not need a medication for this at this time.    CMP Results - Your blood sugar was normal. Your kidney function, electrolytes, and liver function were normal. One test that was elevated was your BUN. This is a kidney marker, but very inaccurate and can be high if fasting or taking in large amounts of protein etc. Your other kidney tests were normal which is reassuring.    Please call the clinic ([phone]), or message us on [employer] with any questions you may have.     Have a great day,    Dr. Weber    The 10-year ASCVD risk score (Gardenia MARIEE, et al., 2019) is: 5.7%    Values used to calculate the score:      Age: 53 years      Sex: Male      Is Non- : No      Diabetic: No      Tobacco smoker: No      Systolic Blood Pressure: 128 mmHg      Is BP treated: Yes      HDL Cholesterol: 66 mg/dL      Total Cholesterol: 221 mg/dL"

## 2024-03-19 ENCOUNTER — TELEPHONE (OUTPATIENT)
Dept: GASTROENTEROLOGY | Facility: CLINIC | Age: 54
End: 2024-03-19
Payer: COMMERCIAL

## 2024-03-19 NOTE — TELEPHONE ENCOUNTER
Pre assessment completed for upcoming procedure.      Procedure details:    Patient scheduled for Colonoscopy  on 3.25.2024.     Arrival time: 1345. Procedure time 1430    Facility location: [facility]; [address] Floor, [place] [address]. Check in location: 2nd Floor at Surgery desk.    Sedation type: Conscious sedation     Pre op exam needed? N/A    Indication for procedure: screening colonoscopy    Designated  policy reviewed. Instructed to have someone stay 6 hours post procedure.       Chart review:     Electronic implanted devices? No    Recent diagnosis of diverticulitis within the last 6 weeks?  No    Diabetic? No      Medication review:    Anticoagulants? No    NSAIDS? No    Other medication HOLDING recommendations:  Hold fiber supplement starting today.      Prep for procedure:     Bowel prep recommendation: Standard Miralax  Due to: standard bowel prep.    Prep instructions sent via [employer]     Reviewed procedure prep instructions.     Patient verbalized understanding and had no questions or concerns at this time.        Elaine Croft RN  Endoscopy Procedure Pre Assessment RN  [phone] option 4

## 2024-03-19 NOTE — TELEPHONE ENCOUNTER
"Endoscopy Scheduling Screen    Have you had a positive Covid test in the last 14 days?  No    What is your communication preference for Instructions and/or Bowel Prep?   MyChart    What insurance is in the chart?  Other:  o    Ordering/Referring Provider: [facility]   (If ordering provider performs procedure, schedule with ordering provider unless otherwise instructed. )    BMI: Estimated body mass index is 31.66 kg/m  as calculated from the following:    Height as of 3/1/24: 1.833 m (6' 0.17\").    Weight as of 3/1/24: 106.4 kg (234 lb 8 oz).     Sedation Ordered  moderate sedation.   If patient BMI > 50 do not schedule in [facility].    If patient BMI > 45 do not schedule at [facility].    Are you taking methadone or Suboxone?  No    Have you had difficulties, pain, or discomfort during past endoscopy procedures?  No    Are you taking any prescription medications for pain 3 or more times per week?   NO, No RN review required.    Do you have a history of malignant hyperthermia?  No    (Females) Are you currently pregnant?   No     Have you been diagnosed or told you have pulmonary hypertension?   No    Do you have an LVAD?  No    Have you been told you have moderate to severe sleep apnea?  No    Have you been told you have COPD, asthma, or any other lung disease?  No    Do you have any heart conditions?  No     Have you ever had or are you waiting for an organ transplant?  No. Continue scheduling, no site restrictions.    Have you had a stroke or transient ischemic attack (TIA aka \"mini stroke\" in the last 6 months?   No    Have you been diagnosed with or been told you have cirrhosis of the liver?   No    Are you currently on dialysis?   No    Do you need assistance transferring?   No    BMI: Estimated body mass index is 31.66 kg/m  as calculated from the following:    Height as of 3/1/24: 1.833 m (6' 0.17\").    Weight as of 3/1/24: 106.4 kg (234 lb 8 oz).     Is patients BMI > 40 and scheduling location [facility]?  No    Do you take an " injectable medication for weight loss or diabetes (excluding insulin)?  No    Do you take the medication Naltrexone?  No    Do you take blood thinners?  No       Prep   Are you currently on dialysis or do you have chronic kidney disease?  No    Do you have a diagnosis of diabetes?  No    Do you have a diagnosis of cystic fibrosis (CF)?  No    On a regular basis do you go 3 -5 days between bowel movements?  No    BMI > 40?  No    Preferred Pharmacy:    [id number] - [place] - [address] [facility]  [address] [place] [address]  Phone: [phone] Fax: [phone]      Final Scheduling Details     Procedure scheduled  Colonoscopy    Surgeon:  Fady     Date of procedure:  3/25     Pre-OP / PAC:   No - Not required for this site.    Location  [facility] - [facility] - Patient preference.    Sedation   Moderate Sedation - Per order.      Patient Reminders:   You will receive a call from a Nurse to review instructions and health history.  This assessment must be completed prior to your procedure.  Failure to complete the Nurse assessment may result in the procedure being cancelled.      On the day of your procedure, please designate an adult(s) who can drive you home stay with you for the next 24 hours. The medicines used in the exam will make you sleepy. You will not be able to drive.      You cannot take public transportation, ride share services, or non-medical taxi service without a responsible caregiver.  Medical transport services are allowed with the requirement that a responsible caregiver will receive you at your destination.  We require that drivers and caregivers are confirmed prior to your procedure.

## 2024-03-21 ENCOUNTER — ALLIED HEALTH/NURSE VISIT (OUTPATIENT)
Dept: FAMILY MEDICINE | Facility: CLINIC | Age: 54
End: 2024-03-21
Payer: COMMERCIAL

## 2024-03-21 ENCOUNTER — LAB (OUTPATIENT)
Dept: LAB | Facility: CLINIC | Age: 54
End: 2024-03-21
Payer: COMMERCIAL

## 2024-03-21 VITALS — DIASTOLIC BLOOD PRESSURE: 82 MMHG | SYSTOLIC BLOOD PRESSURE: 124 MMHG

## 2024-03-21 DIAGNOSIS — I10 BENIGN ESSENTIAL HYPERTENSION: ICD-10-CM

## 2024-03-21 DIAGNOSIS — I10 BENIGN ESSENTIAL HYPERTENSION: Primary | ICD-10-CM

## 2024-03-21 PROCEDURE — 36415 COLL VENOUS BLD VENIPUNCTURE: CPT

## 2024-03-21 PROCEDURE — 99207 PR NO CHARGE NURSE ONLY: CPT

## 2024-03-21 PROCEDURE — 80048 BASIC METABOLIC PNL TOTAL CA: CPT

## 2024-03-21 NOTE — PROGRESS NOTES
Wilner Clement is a 53 year old patient who comes in today for a Blood Pressure check.  Initial BP:  /82 (BP Location: Right arm, Patient Position: Sitting, Cuff Size: Adult Large)      Data Unavailable  Disposition: results routed to provider

## 2024-03-22 DIAGNOSIS — I10 BENIGN ESSENTIAL HYPERTENSION: ICD-10-CM

## 2024-03-22 LAB
ANION GAP SERPL CALCULATED.3IONS-SCNC: 9 MMOL/L (ref 7–15)
BUN SERPL-MCNC: 29.6 MG/DL (ref 6–20)
CALCIUM SERPL-MCNC: 9.2 MG/DL (ref 8.6–10)
CHLORIDE SERPL-SCNC: 103 MMOL/L (ref 98–107)
CREAT SERPL-MCNC: 0.92 MG/DL (ref 0.67–1.17)
DEPRECATED HCO3 PLAS-SCNC: 26 MMOL/L (ref 22–29)
EGFRCR SERPLBLD CKD-EPI 2021: >90 ML/MIN/1.73M2
GLUCOSE SERPL-MCNC: 91 MG/DL (ref 70–99)
POTASSIUM SERPL-SCNC: 4.3 MMOL/L (ref 3.4–5.3)
SODIUM SERPL-SCNC: 138 MMOL/L (ref 135–145)

## 2024-03-22 RX ORDER — LOSARTAN POTASSIUM 50 MG/1
50 TABLET ORAL DAILY
Qty: 90 TABLET | Refills: 3 | Status: SHIPPED | OUTPATIENT
Start: 2024-03-22

## 2024-03-23 NOTE — RESULT ENCOUNTER NOTE
"Sandro Burgos,    If you have not viewed these results on [employer] within 3 days, we will use an alternative method to contact you. We will contact you via the following protocol:    - Via letter if your results are normal.  - Via phone ([phone]) if your results are abnormal.     Here are my comments about your recent results:    BMP - Your blood sugar was normal. Your kidney function and electrolytes were normal.    Your blood pressure was normal. I am happy with this result. I have sent a year supply of refills to your pharmacy.    Please call the clinic ([phone]), or message us on [employer] with any questions you may have.     Have a great day,    Dr. Weber    Additional information regarding your Blood Pressure:  We care about controlling high blood pressure to decrease your risk of heart disease, stroke, and kidney disease.    Your goal blood pressure is < 140/90. It was under good control today!    Since your blood pressure is under good control, I recommend you check your blood pressures once weekly (taking 2 readings each time, 1-2 minutes apart). Schedule a follow-up visit with me if your numbers are becoming consistently above our set blood pressure goal above.    If applicable, decrease your caffeine consumption.    Limit alcohol use.    Stay on a strict sleep schedule, going to bed at the same time, get up at the same time, and give yourself at least 8 hours.  If noticing snoring, daytime sleepiness, or episodes where you stop breathing in your sleep request a sleep study.    Work on weight loss/maintaining a healthy weight:  Follow the DASH diet. (https://www.nhlbi.nih.gov/education/dash-eating-plan)  Increase exercise to 30 minutes a day 5 days a week (150 minutes a week on average).    Some over the counter medications can increase blood pressures like:  Sudafed (pseudoephedrine) containing products (also called \"decongestants\")   NSAID medications like ibuprofen (Advil and Motrin) and naproxen " (Aleve or Naprosyn)

## 2024-03-25 ENCOUNTER — HOSPITAL ENCOUNTER (OUTPATIENT)
Facility: AMBULATORY SURGERY CENTER | Age: 54
Discharge: HOME | End: 2024-03-25
Attending: COLON & RECTAL SURGERY | Admitting: COLON & RECTAL SURGERY
Payer: COMMERCIAL

## 2024-03-25 VITALS
HEART RATE: 60 BPM | OXYGEN SATURATION: 100 % | RESPIRATION RATE: 16 BRPM | SYSTOLIC BLOOD PRESSURE: 134 MMHG | TEMPERATURE: 96.7 F | DIASTOLIC BLOOD PRESSURE: 86 MMHG

## 2024-03-25 LAB — COLONOSCOPY: NORMAL

## 2024-03-25 PROCEDURE — G8907 PT DOC NO EVENTS ON DISCHARG: HCPCS

## 2024-03-25 PROCEDURE — 45380 COLONOSCOPY AND BIOPSY: CPT

## 2024-03-25 PROCEDURE — G8918 PT W/O PREOP ORDER IV AB PRO: HCPCS

## 2024-03-25 PROCEDURE — 88305 TISSUE EXAM BY PATHOLOGIST: CPT | Performed by: PATHOLOGY

## 2024-03-25 RX ORDER — ONDANSETRON 2 MG/ML
4 INJECTION INTRAMUSCULAR; INTRAVENOUS
Status: DISCONTINUED | OUTPATIENT
Start: 2024-03-25 | End: 2024-03-26 | Stop reason: HOSPADM

## 2024-03-25 RX ORDER — NALOXONE HYDROCHLORIDE 0.4 MG/ML
0.2 INJECTION, SOLUTION INTRAMUSCULAR; INTRAVENOUS; SUBCUTANEOUS
Status: DISCONTINUED | OUTPATIENT
Start: 2024-03-25 | End: 2024-03-26 | Stop reason: HOSPADM

## 2024-03-25 RX ORDER — ONDANSETRON 2 MG/ML
4 INJECTION INTRAMUSCULAR; INTRAVENOUS EVERY 6 HOURS PRN
Status: DISCONTINUED | OUTPATIENT
Start: 2024-03-25 | End: 2024-03-26 | Stop reason: HOSPADM

## 2024-03-25 RX ORDER — ONDANSETRON 4 MG/1
4 TABLET, ORALLY DISINTEGRATING ORAL EVERY 6 HOURS PRN
Status: DISCONTINUED | OUTPATIENT
Start: 2024-03-25 | End: 2024-03-26 | Stop reason: HOSPADM

## 2024-03-25 RX ORDER — FLUMAZENIL 0.1 MG/ML
0.2 INJECTION, SOLUTION INTRAVENOUS
Status: DISCONTINUED | OUTPATIENT
Start: 2024-03-25 | End: 2024-03-26 | Stop reason: HOSPADM

## 2024-03-25 RX ORDER — NALOXONE HYDROCHLORIDE 0.4 MG/ML
0.4 INJECTION, SOLUTION INTRAMUSCULAR; INTRAVENOUS; SUBCUTANEOUS
Status: DISCONTINUED | OUTPATIENT
Start: 2024-03-25 | End: 2024-03-26 | Stop reason: HOSPADM

## 2024-03-25 RX ORDER — FENTANYL CITRATE 50 UG/ML
INJECTION, SOLUTION INTRAMUSCULAR; INTRAVENOUS PRN
Status: DISCONTINUED | OUTPATIENT
Start: 2024-03-25 | End: 2024-03-25 | Stop reason: HOSPADM

## 2024-03-25 RX ORDER — PROCHLORPERAZINE MALEATE 10 MG
10 TABLET ORAL EVERY 6 HOURS PRN
Status: DISCONTINUED | OUTPATIENT
Start: 2024-03-25 | End: 2024-03-26 | Stop reason: HOSPADM

## 2024-03-25 RX ORDER — LIDOCAINE 40 MG/G
CREAM TOPICAL
Status: DISCONTINUED | OUTPATIENT
Start: 2024-03-25 | End: 2024-03-26 | Stop reason: HOSPADM

## 2024-03-25 NOTE — H&P
Pre-Endoscopy History and Physical     Wilner Clement MRN# [id number]   YOB: 1970 Age: 53 year old     Date of Procedure: 03/25/24  Primary care provider: Milan Aviles  Type of Endoscopy: Colonoscopy  Reason for Procedure: screening  Type of Anesthesia Anticipated: Moderate Sedation    HPI:    Wilner is a 53 year old male who will be undergoing the above procedure.      Prior colonoscopy: 2019    A history and physical has been performed, notable for family history of colon cancer in his father . The patient's medications and allergies have been reviewed. The risks and benefits of the procedure and the sedation options and risks were discussed with the patient.  All questions were answered and informed consent was obtained.      He denies a personal or family history of anesthesia complications or bleeding disorders.  He has no bowel symptoms.   Allergies   Allergen Reactions    No Known Drug Allergy       Allergy to Latex: NO   Allergy to tape: NO   Intolerances: NO     No current outpatient medications on file prior to encounter.  No current facility-administered medications on file prior to encounter.      Patient Active Problem List   Diagnosis    Family history of diabetes mellitus    Family history of ischemic heart disease    Neoplasm of uncertain behavior of skin    CARDIOVASCULAR SCREENING; LDL GOAL LESS THAN 160    History of melanoma in situ    Family history of colon cancer    Cherry angioma    Multiple nevi        Past Medical History:   Diagnosis Date    Cancer (H)     Skin    Cancer (H)     melanoma- back    Lumbago 01/01/2003    MEDICAL HISTORY OF - 01/01/2003    left knee pain    MEDICAL HISTORY OF - 2005,2014    colonoscopy- due 2019        Past Surgical History:   Procedure Laterality Date    BIOPSY  01/01/2011    Skin biopsy/mole on back removed.    BIOPSY      Skin cancer    COLONOSCOPY      COLONOSCOPY  02/24/2014    Procedure: COLONOSCOPY;  Colonoscopy, screening;   Surgeon: Alanis Piña MD;  Location: [facility] OR    COLONOSCOPY WITH CO2 INSUFFLATION N/A 04/08/2019    Procedure: COLONOSCOPY WITH CO2 INSUFFLATION;  Surgeon: Bj Stephens MD;  Location: [facility] OR    GI SURGERY  4/8/2019       Social History     Tobacco Use    Smoking status: Never    Smokeless tobacco: Never   Substance Use Topics    Alcohol use: Yes     Comment: 2-3 drinks per week        Family History   Problem Relation Age of Onset    Hypertension Mother     Genitourinary Problems Mother         kidney insufficiency    Kidney Cancer Mother     Cancer - colorectal Father         diag. age 56    Breast Cancer Sister     Diabetes Brother     Breast Cancer Maternal Grandmother     Asthma No family hx of     C.A.D. No family hx of     Cerebrovascular Disease No family hx of     Prostate Cancer No family hx of     Alcohol/Drug No family hx of     Allergies No family hx of     Alzheimer Disease No family hx of     Anesthesia Reaction No family hx of     Arthritis No family hx of     Blood Disease No family hx of     Cardiovascular No family hx of     Connective Tissue Disorder No family hx of     Congenital Anomalies No family hx of     Circulatory No family hx of     Cancer No family hx of     Depression No family hx of     Gastrointestinal Disease No family hx of     Eye Disorder No family hx of     Endocrine Disease No family hx of     Genetic Disorder No family hx of     Gynecology No family hx of     Heart Disease No family hx of     Lipids No family hx of     Family History Negative No family hx of     Thyroid Disease No family hx of     Respiratory No family hx of     Psychotic Disorder No family hx of     Osteoporosis No family hx of     Obesity No family hx of     Musculoskeletal Disorder No family hx of     Neurologic Disorder No family hx of     Hearing Loss No family hx of        REVIEW OF SYSTEMS:     5 point ROS negative except as noted above in HPI, including Gen., Resp., CV, GI &  system  "review.      PHYSICAL EXAM:   /77 (Cuff Size: Adult Large)   Pulse 62   Temp (!) 96.7  F (35.9  C) (Temporal)   Resp 16   SpO2 99%  Estimated body mass index is 31.66 kg/m  as calculated from the following:    Height as of 3/1/24: 1.833 m (6' 0.17\").    Weight as of 3/1/24: 106.4 kg (234 lb 8 oz).   All Vitals have been reviewed.    GENERAL APPEARANCE: healthy and alert  MENTAL STATUS: alert  AIRWAY EXAM: Mallampatti Class II (visualization of the soft palate, fauces, and uvula)  RESP: lungs clear to auscultation - no rales, rhonchi or wheezes  CV: regular rates and rhythm      IMPRESSION   ASA Class 2 - Mild systemic disease        PLAN:     Plan for colonoscopy. We discussed the risks, benefits and alternatives and the patient wished to proceed.    The above has been forwarded to the consulting provider.    Isis Shrestha MD, MS, FACS, FASCRS  [facility]  Office: [phone]  3/25/2024 12:57 PM         " (0) indicator not present

## 2024-03-27 LAB
PATH REPORT.COMMENTS IMP SPEC: NORMAL
PATH REPORT.COMMENTS IMP SPEC: NORMAL
PATH REPORT.FINAL DX SPEC: NORMAL
PATH REPORT.GROSS SPEC: NORMAL
PATH REPORT.MICROSCOPIC SPEC OTHER STN: NORMAL
PATH REPORT.RELEVANT HX SPEC: NORMAL
PHOTO IMAGE: NORMAL

## 2025-01-08 SDOH — HEALTH STABILITY: PHYSICAL HEALTH: ON AVERAGE, HOW MANY MINUTES DO YOU ENGAGE IN EXERCISE AT THIS LEVEL?: 50 MIN

## 2025-01-08 SDOH — HEALTH STABILITY: PHYSICAL HEALTH: ON AVERAGE, HOW MANY DAYS PER WEEK DO YOU ENGAGE IN MODERATE TO STRENUOUS EXERCISE (LIKE A BRISK WALK)?: 5 DAYS

## 2025-01-08 ASSESSMENT — SOCIAL DETERMINANTS OF HEALTH (SDOH): HOW OFTEN DO YOU GET TOGETHER WITH FRIENDS OR RELATIVES?: ONCE A WEEK

## 2025-01-13 ENCOUNTER — MYC MEDICAL ADVICE (OUTPATIENT)
Dept: FAMILY MEDICINE | Facility: CLINIC | Age: 55
End: 2025-01-13
Payer: COMMERCIAL

## 2025-01-13 NOTE — TELEPHONE ENCOUNTER
Patient scheduled for physical with Dr. Weber on 1/15 and is not due until 3/1. Patient will need to check with insurance company to see if visit can be done or will need to reschedule.  If patient has other concerns he would like to address, change to office visit. Krystal carlson.

## 2025-01-14 ENCOUNTER — APPOINTMENT (OUTPATIENT)
Dept: URBAN - METROPOLITAN AREA CLINIC 259 | Age: 55
Setting detail: DERMATOLOGY
End: 2025-01-15

## 2025-01-14 DIAGNOSIS — D49.2 NEOPLASM OF UNSPECIFIED BEHAVIOR OF BONE, SOFT TISSUE, AND SKIN: ICD-10-CM

## 2025-01-14 DIAGNOSIS — D18.0 HEMANGIOMA: ICD-10-CM

## 2025-01-14 DIAGNOSIS — Z85.828 PERSONAL HISTORY OF OTHER MALIGNANT NEOPLASM OF SKIN: ICD-10-CM

## 2025-01-14 DIAGNOSIS — D22 MELANOCYTIC NEVI: ICD-10-CM

## 2025-01-14 DIAGNOSIS — L81.4 OTHER MELANIN HYPERPIGMENTATION: ICD-10-CM

## 2025-01-14 DIAGNOSIS — L57.8 OTHER SKIN CHANGES DUE TO CHRONIC EXPOSURE TO NONIONIZING RADIATION: ICD-10-CM

## 2025-01-14 DIAGNOSIS — Z71.89 OTHER SPECIFIED COUNSELING: ICD-10-CM

## 2025-01-14 DIAGNOSIS — L72.8 OTHER FOLLICULAR CYSTS OF THE SKIN AND SUBCUTANEOUS TISSUE: ICD-10-CM

## 2025-01-14 DIAGNOSIS — Z85.820 PERSONAL HISTORY OF MALIGNANT MELANOMA OF SKIN: ICD-10-CM

## 2025-01-14 DIAGNOSIS — L82.1 OTHER SEBORRHEIC KERATOSIS: ICD-10-CM

## 2025-01-14 PROBLEM — D23.71 OTHER BENIGN NEOPLASM OF SKIN OF RIGHT LOWER LIMB, INCLUDING HIP: Status: ACTIVE | Noted: 2025-01-14

## 2025-01-14 PROBLEM — D18.01 HEMANGIOMA OF SKIN AND SUBCUTANEOUS TISSUE: Status: ACTIVE | Noted: 2025-01-14

## 2025-01-14 PROBLEM — D22.5 MELANOCYTIC NEVI OF TRUNK: Status: ACTIVE | Noted: 2025-01-14

## 2025-01-14 PROCEDURE — 99213 OFFICE O/P EST LOW 20 MIN: CPT | Mod: 25

## 2025-01-14 PROCEDURE — 11102 TANGNTL BX SKIN SINGLE LES: CPT

## 2025-01-14 PROCEDURE — OTHER MIPS QUALITY: OTHER

## 2025-01-14 PROCEDURE — OTHER COUNSELING: OTHER

## 2025-01-14 PROCEDURE — OTHER BIOPSY BY SHAVE METHOD: OTHER

## 2025-01-14 ASSESSMENT — LOCATION SIMPLE DESCRIPTION DERM
LOCATION SIMPLE: LEFT EAR
LOCATION SIMPLE: RIGHT THIGH
LOCATION SIMPLE: LEFT UPPER BACK
LOCATION SIMPLE: UPPER BACK
LOCATION SIMPLE: RIGHT UPPER BACK

## 2025-01-14 ASSESSMENT — LOCATION DETAILED DESCRIPTION DERM
LOCATION DETAILED: RIGHT SUPERIOR MEDIAL UPPER BACK
LOCATION DETAILED: LEFT MID-UPPER BACK
LOCATION DETAILED: LEFT SUPERIOR UPPER BACK
LOCATION DETAILED: INFERIOR THORACIC SPINE
LOCATION DETAILED: RIGHT ANTERIOR DISTAL THIGH
LOCATION DETAILED: LEFT LATERAL UPPER BACK
LOCATION DETAILED: LEFT CAVUM CONCHA
LOCATION DETAILED: RIGHT SUPERIOR UPPER BACK

## 2025-01-14 ASSESSMENT — LOCATION ZONE DERM
LOCATION ZONE: TRUNK
LOCATION ZONE: EAR
LOCATION ZONE: LEG

## 2025-01-14 NOTE — PROCEDURE: BIOPSY BY SHAVE METHOD
Detail Level: Detailed
Depth Of Biopsy: dermis
Was A Bandage Applied: Yes
Size Of Lesion In Cm: 0.6
X Size Of Lesion In Cm: 0
Biopsy Type: H and E
Biopsy Method: double edge Personna blade
Anesthesia Type: 1% lidocaine without epinephrine and a 1:10 solution of 8.4% sodium bicarbonate
Anesthesia Volume In Cc: 0.3
Hemostasis: Drysol
Wound Care: Vaseline
Dressing: bandage
Destruction After The Procedure: No
Type Of Destruction Used: Curettage
Curettage Text: The wound bed was treated with curettage after the biopsy was performed.
Cryotherapy Text: The wound bed was treated with cryotherapy after the biopsy was performed.
Electrodesiccation Text: The wound bed was treated with electrodesiccation after the biopsy was performed.
Electrodesiccation And Curettage Text: The wound bed was treated with electrodesiccation and curettage after the biopsy was performed.
Silver Nitrate Text: The wound bed was treated with silver nitrate after the biopsy was performed.
Lab: -[id number]
Medical Necessity Information: It is in your best interest to select a reason for this procedure from the list below. All of these items fulfill various CMS LCD requirements except the new and changing color options.
Consent: Written consent was obtained and risks were reviewed including but not limited to scarring, infection, bleeding, scabbing, incomplete removal, nerve damage and allergy to anesthesia.
Post-Care Instructions: I reviewed with the patient in detail post-care instructions. Patient is to keep the biopsy site dry overnight, and then apply vaseline twice daily until healed. Patient may apply hydrogen peroxide soaks to remove any crusting.
Notification Instructions: Patient will be notified of biopsy results. However, patient instructed to call the office if not contacted within 2 weeks.
Billing Type: Third-Party Bill
Information: Selecting Yes will display possible errors in your note based on the variables you have selected. This validation is only offered as a suggestion for you. PLEASE NOTE THAT THE VALIDATION TEXT WILL BE REMOVED WHEN YOU FINALIZE YOUR NOTE. IF YOU WANT TO FAX A PRELIMINARY NOTE YOU WILL NEED TO TOGGLE THIS TO 'NO' IF YOU DO NOT WANT IT IN YOUR FAXED NOTE.

## 2025-01-15 ENCOUNTER — MYC MEDICAL ADVICE (OUTPATIENT)
Dept: FAMILY MEDICINE | Facility: CLINIC | Age: 55
End: 2025-01-15

## 2025-01-15 ENCOUNTER — OFFICE VISIT (OUTPATIENT)
Dept: FAMILY MEDICINE | Facility: CLINIC | Age: 55
End: 2025-01-15
Payer: COMMERCIAL

## 2025-01-15 VITALS
HEIGHT: 73 IN | WEIGHT: 242.5 LBS | TEMPERATURE: 97.2 F | HEART RATE: 73 BPM | OXYGEN SATURATION: 97 % | BODY MASS INDEX: 32.14 KG/M2 | DIASTOLIC BLOOD PRESSURE: 74 MMHG | RESPIRATION RATE: 13 BRPM | SYSTOLIC BLOOD PRESSURE: 126 MMHG

## 2025-01-15 DIAGNOSIS — I10 BENIGN ESSENTIAL HYPERTENSION: ICD-10-CM

## 2025-01-15 DIAGNOSIS — N52.9 ERECTILE DYSFUNCTION, UNSPECIFIED ERECTILE DYSFUNCTION TYPE: Primary | ICD-10-CM

## 2025-01-15 DIAGNOSIS — N52.9 ERECTILE DYSFUNCTION, UNSPECIFIED ERECTILE DYSFUNCTION TYPE: ICD-10-CM

## 2025-01-15 DIAGNOSIS — Z00.00 ROUTINE GENERAL MEDICAL EXAMINATION AT A HEALTH CARE FACILITY: Primary | ICD-10-CM

## 2025-01-15 DIAGNOSIS — N17.9 AKI (ACUTE KIDNEY INJURY): ICD-10-CM

## 2025-01-15 LAB
BASOPHILS # BLD AUTO: 0 10E3/UL (ref 0–0.2)
BASOPHILS NFR BLD AUTO: 0 %
EOSINOPHIL # BLD AUTO: 0.1 10E3/UL (ref 0–0.7)
EOSINOPHIL NFR BLD AUTO: 1 %
ERYTHROCYTE [DISTWIDTH] IN BLOOD BY AUTOMATED COUNT: 12.1 % (ref 10–15)
HCT VFR BLD AUTO: 43.5 % (ref 40–53)
HGB BLD-MCNC: 15.1 G/DL (ref 13.3–17.7)
IMM GRANULOCYTES # BLD: 0 10E3/UL
IMM GRANULOCYTES NFR BLD: 0 %
LYMPHOCYTES # BLD AUTO: 1.6 10E3/UL (ref 0.8–5.3)
LYMPHOCYTES NFR BLD AUTO: 19 %
MCH RBC QN AUTO: 30.4 PG (ref 26.5–33)
MCHC RBC AUTO-ENTMCNC: 34.7 G/DL (ref 31.5–36.5)
MCV RBC AUTO: 88 FL (ref 78–100)
MONOCYTES # BLD AUTO: 0.5 10E3/UL (ref 0–1.3)
MONOCYTES NFR BLD AUTO: 6 %
NEUTROPHILS # BLD AUTO: 6.2 10E3/UL (ref 1.6–8.3)
NEUTROPHILS NFR BLD AUTO: 74 %
PLATELET # BLD AUTO: 191 10E3/UL (ref 150–450)
RBC # BLD AUTO: 4.97 10E6/UL (ref 4.4–5.9)
WBC # BLD AUTO: 8.4 10E3/UL (ref 4–11)

## 2025-01-15 PROCEDURE — 85025 COMPLETE CBC W/AUTO DIFF WBC: CPT | Performed by: FAMILY MEDICINE

## 2025-01-15 PROCEDURE — 36415 COLL VENOUS BLD VENIPUNCTURE: CPT | Performed by: FAMILY MEDICINE

## 2025-01-15 RX ORDER — LOSARTAN POTASSIUM 50 MG/1
50 TABLET ORAL DAILY
Qty: 90 TABLET | Refills: 3 | Status: SHIPPED | OUTPATIENT
Start: 2025-01-15

## 2025-01-15 RX ORDER — SILDENAFIL 25 MG/1
25 TABLET, FILM COATED ORAL DAILY PRN
COMMUNITY
Start: 2025-01-15 | End: 2025-01-16

## 2025-01-15 ASSESSMENT — PAIN SCALES - GENERAL: PAINLEVEL_OUTOF10: NO PAIN (0)

## 2025-01-15 NOTE — PROGRESS NOTES
Preventive Care Visit  [facility]  Milan Aviles MD, Family Medicine  Adam 15, 2025    Assessment & Plan   1. Routine general medical examination at a health care facility (Primary)  Discussed personal health and safety. Routine screenings ordered as below. Appropriate anticipatory guidance, vaccinations, and health screening recommendations delivered according to the USPSTF and other appropriate society guidelines. Aubrey reports understanding and in agreement with this mutually agreed upon plan.    Today's Orders:  - REVIEW OF HEALTH MAINTENANCE PROTOCOL ORDERS  - Lipid panel reflex to direct LDL Non-fasting; Future  - Comprehensive metabolic panel; Future  - CBC with Platelets & Differential; Future  - Prostate Specific Antigen Screen; Future    2. Benign essential hypertension  Symptoms stable/controlled. Medication monitoring labs ordered/reviewed. Tolerating pharmacotherapy well. Continue current regiment. Medication refilled per orders as below.   - losartan (COZAAR) 50 MG tablet; Take 1 tablet (50 mg) by mouth daily.  Dispense: 90 tablet; Refill: 3    3. Erectile dysfunction, unspecified erectile dysfunction type  Gets sildenafil through [facility]. Discussed options through [facility] and [facility], [employer] message via [employer] if he wants to switch.       Follow-up Visit   Expected date:  Adam 15, 2026 (Approximate)      Follow Up Appointment Details:     Follow-up with whom?: PCP    Follow-Up for what?: Adult Preventive    How?: In Person               Milan Aviles MD  [facility]    Disclaimer: This note consists of symbols derived from keyboarding, dictation and/or voice recognition software. As a result, there may be errors in the script that have gone undetected. Please consider this when interpreting information found in this chart.    The longitudinal plan of care for the diagnosis(es)/condition(s) as documented were addressed  during this visit. Due to the added complexity in care, I will continue to support Aubrey in the subsequent management and with ongoing continuity of care.    Subjective   Aubrey is a 54 year old, presenting for the following:  Physical        1/15/2025     2:17 PM   Additional Questions   Roomed by KB   Accompanied by self      HPI    Hypertension Follow-up    Do you check your blood pressure regularly outside of the clinic? Yes   Are you following a low salt diet? Yes  Are your blood pressures ever more than 140 on the top number (systolic) OR more   than 90 on the bottom number (diastolic), for example 140/90? Yes    Hypertension:  Currently controlled.     Aubrey has had a positive response to his current hypertension treatment regimen. he reports adherence to his prescribed medications, which are listed below, with no medication side effects noted. Aubrey  notes a significant improvement in his symptoms since initiating treatment, including a reduction in blood pressure readings. he states that he has been monitoring his blood pressure at home. Overall, Aubrey expresses satisfaction with his current management plan and reports a notable enhancement in his quality of life since starting treatment for hypertension.    he specifically denies current symptoms of chest pain, shortness of breath, claudication symptoms, etc.      Current medications:  Medication (Note: This includes the hypertensive combination subclass to make sure to show all ACEI/ARB's.)       Angiotensin II Receptor Antagonists       losartan (COZAAR) 50 MG tablet Take 1 tablet (50 mg) by mouth daily.                Health Care Directive  Patient does not have a Health Care Directive: Discussed advance care planning with patient; however, patient declined at this time.      1/8/2025   General Health   How would you rate your overall physical health? Good   Feel stress (tense, anxious, or unable to sleep) Not at all         1/8/2025   Nutrition   Three or more  servings of calcium each day? Yes   Diet: Regular (no restrictions)   How many servings of fruit and vegetables per day? (!) 2-3   How many sweetened beverages each day? 0-1         1/8/2025   Exercise   Days per week of moderate/strenous exercise 5 days   Average minutes spent exercising at this level 50 min         1/8/2025   Social Factors   Frequency of gathering with friends or relatives Once a week   Worry food won't last until get money to buy more No   Food not last or not have enough money for food? No   Do you have housing? (Housing is defined as stable permanent housing and does not include staying ouside in a car, in a tent, in an abandoned building, in an overnight shelter, or couch-surfing.) Yes   Are you worried about losing your housing? No   Lack of transportation? No   Unable to get utilities (heat,electricity)? No         1/8/2025   Fall Risk   Fallen 2 or more times in the past year? No   Trouble with walking or balance? No          1/8/2025   Dental   Dentist two times every year? Yes         1/8/2025   TB Screening   Were you born outside of the [place]? No         Today's PHQ-2 Score:       1/14/2025     4:06 PM   PHQ-2 ( 1999 [employer])   Q1: Little interest or pleasure in doing things 0   Q2: Feeling down, depressed or hopeless 0   PHQ-2 Score 0    Q1: Little interest or pleasure in doing things Not at all   Q2: Feeling down, depressed or hopeless Not at all   PHQ-2 Score 0       Patient-reported           1/8/2025   Substance Use   Alcohol more than 3/day or more than 7/wk No   Do you use any other substances recreationally? No     Social History     Tobacco Use    Smoking status: Never    Smokeless tobacco: Never   Vaping Use    Vaping status: Never Used   Substance Use Topics    Alcohol use: Yes     Comment: 2-3 drinks per week     Drug use: No           1/8/2025   STI Screening   New sexual partner(s) since last STI/HIV test? No   Last PSA:   PSA   Date Value Ref Range Status   11/05/2019 0.74 0  "- 4 ug/L Final     Comment:     Assay Method:  Chemiluminescence using Siemens Vista analyzer     ASCVD Risk   The 10-year ASCVD risk score (Gardenia MARIEE, et al., 2019) is: 6.1%    Values used to calculate the score:      Age: 54 years      Sex: Male      Is Non- : No      Diabetic: No      Tobacco smoker: No      Systolic Blood Pressure: 126 mmHg      Is BP treated: Yes      HDL Cholesterol: 66 mg/dL      Total Cholesterol: 221 mg/dL    Reviewed and updated as needed this visit by Provider   Tobacco  Allergies  Meds  Problems  Med Hx  Surg Hx  Fam Hx        Social Hx Reviewed    Past Medical History:   Diagnosis Date    Cancer (H)     Skin    Cancer (H)     melanoma- back    Lumbago 01/01/2003    MEDICAL HISTORY OF - 01/01/2003    left knee pain    MEDICAL HISTORY OF - 2005,2014    colonoscopy- due 2019     Past Surgical History:   Procedure Laterality Date    BIOPSY  01/01/2011    Skin biopsy/mole on back removed.    BIOPSY      Skin cancer    COLONOSCOPY      COLONOSCOPY  02/24/2014    Procedure: COLONOSCOPY;  Colonoscopy, screening;  Surgeon: Alanis Piña MD;  Location: [facility] OR    COLONOSCOPY N/A 3/25/2024    Procedure: COLONOSCOPY, WITH POLYPECTOMY AND BIOPSY;  Surgeon: Isis Shrestha MD;  Location: [facility] OR    COLONOSCOPY WITH CO2 INSUFFLATION N/A 04/08/2019    Procedure: COLONOSCOPY WITH CO2 INSUFFLATION;  Surgeon: Bj Stephens MD;  Location: [facility] OR    COLONOSCOPY WITH CO2 INSUFFLATION N/A 3/25/2024    Procedure: Colonoscopy with CO2 insufflation;  Surgeon: Isis Shrestha MD;  Location: [facility] OR    GI SURGERY  4/8/2019         Review of Systems  Constitutional, HEENT, cardiovascular, pulmonary, GI, , musculoskeletal, neuro, skin, endocrine and psych systems are negative, except as otherwise noted.     Objective    Exam  /74   Pulse 73   Temp 97.2  F (36.2  C) (Temporal)   Resp 13   Ht 1.854 m (6' 1\")   Wt 110 kg (242 lb 8 oz)   SpO2 " "97%   BMI 31.99 kg/m     Estimated body mass index is 31.99 kg/m  as calculated from the following:    Height as of this encounter: 1.854 m (6' 1\").    Weight as of this encounter: 110 kg (242 lb 8 oz).    Physical Exam  HENT:      Head: Normocephalic and atraumatic.      Right Ear: Tympanic membrane, ear canal and external ear normal. There is no impacted cerumen.      Left Ear: Tympanic membrane, ear canal and external ear normal. There is no impacted cerumen.      Nose: Nose normal. No congestion.      Mouth/Throat:      Pharynx: Oropharynx is clear. No oropharyngeal exudate or posterior oropharyngeal erythema.   Eyes:      General:         Right eye: No discharge.         Left eye: No discharge.      Extraocular Movements: Extraocular movements intact.      Conjunctiva/sclera: Conjunctivae normal.      Pupils: Pupils are equal, round, and reactive to light.   Cardiovascular:      Rate and Rhythm: Normal rate and regular rhythm.      Heart sounds: Normal heart sounds. No murmur heard.     No friction rub.   Pulmonary:      Effort: Pulmonary effort is normal. No respiratory distress.      Breath sounds: Normal breath sounds. No wheezing or rhonchi.   Abdominal:      General: Abdomen is flat. There is no distension.      Palpations: Abdomen is soft. There is no mass.      Tenderness: There is no abdominal tenderness. There is no guarding.   Musculoskeletal:         General: No swelling.      Cervical back: Normal range of motion and neck supple. No tenderness.      Right lower leg: No edema.      Left lower leg: No edema.   Lymphadenopathy:      Cervical: No cervical adenopathy.   Skin:     General: Skin is warm.      Findings: No rash.   Neurological:      General: No focal deficit present.      Mental Status: He is alert.      Cranial Nerves: No cranial nerve deficit.      Motor: No weakness.      Coordination: Coordination normal.      Gait: Gait normal.   Psychiatric:         Mood and Affect: Mood normal.    "      Behavior: Behavior normal.         Thought Content: Thought content normal.         Judgment: Judgment normal.       Labs: Pending    Signed Electronically by: Milan Aviles MD

## 2025-01-15 NOTE — RESULT ENCOUNTER NOTE
Sandro Burgos,    If you have not viewed these results on [employer] within 3 days, we will use an alternative method to contact you. We will contact you via the following protocol:    - Via letter if your results are normal.  - Via phone ([phone]) if your results are abnormal.     Here are my comments about your recent results:    CBC Results - Your cell counts were normal.    Please call the clinic ([phone]), or message us on [employer] with any questions you may have.     Have a great day,    Dr. Weber

## 2025-01-15 NOTE — PATIENT INSTRUCTIONS
"Important Takeaway Points From This Visit:     Call your insurance about where to get the shingles vaccine if interested.     Regarding your Blood Pressure:  We care about controlling high blood pressure to decrease your risk of heart disease, stroke, and kidney disease.    Your goal blood pressure is < 140/90. It was under good control today!    Since your blood pressure is under good control, I recommend you check your blood pressures once weekly (taking 2 readings each time, 1-2 minutes apart). Schedule a follow-up visit with me if your numbers are becoming consistently above our set blood pressure goal above.    If applicable, decrease your caffeine consumption.    Limit alcohol use.    Stay on a strict sleep schedule, going to bed at the same time, get up at the same time, and give yourself at least 8 hours.  If noticing snoring, daytime sleepiness, or episodes where you stop breathing in your sleep request a sleep study.    Work on weight loss/maintaining a healthy weight:  Follow the DASH diet. (https://www.nhlbi.nih.gov/education/dash-eating-plan)  Increase exercise to 30 minutes a day 5 days a week (150 minutes a week on average).    Some over the counter medications can increase blood pressures like:  Sudafed (pseudoephedrine) containing products (also called \"decongestants\")   NSAID medications like ibuprofen (Advil and Motrin) and naproxen (Aleve or Naprosyn)                Regarding your Erectile Dysfunction (ED):    Medication use instructions:  Only take one dose of the medication every 24 hours as needed. You need to take this 30 min - 1 hour before sex.  You still need mental arousal, or a visual/physical stimulus to start blood flow going to the penis, it will not just give you an erection for 4 straight hours. It is supposed to make everything work how it did at a younger age.  I recommend you try it by yourself first to check for side effects, and effectiveness, before trying with a partner. "   The dose can be increased to the highest listed dose above if needed for effectiveness, but start with what was listed.  Alert me if you have any other side effects with the medication (headache, gi upset, etc).    Important warnings about the medication:  I strongly recommended you not mix this with alcohol as it can cause low blood pressure.   You cannot mix it with nitroglycerin. (Usually given by paramedics if they think you are having a heart attack). You must tell them if you took this medication that day.  Go to the ER for erections lasting longer than 4 hours.     Pricing considerations:  Insurance commonly does not cover these medications. If they do, consider using the information below.    I have no financial disclosures for either of the websites listed below, I am simply trying to get you the best pricing because these can be insanely marked up ($$$).    I recommend against using the [facility] pharmacy for this without checking the price first. I do not have a good way to check prices at other pharmacies such as [employer].    The medication is typically much cheaper using a coupon from a website called [employer]. This can commonly be used at [employer], [employer], [employer], [employer], and [employer].     Another cheaper alternative I have found is to use [facility]. This is an online pharmacy by the business man José Miguel Pastor.       Patient Education   Preventive Care Advice   This is general advice given by our system to help you stay healthy. However, your care team may have specific advice just for you. Please talk to your care team about your preventive care needs.  Nutrition  Eat 5 or more servings of fruits and vegetables each day.  Try wheat bread, brown rice and whole grain pasta (instead of white bread, rice, and pasta).  Get enough calcium and vitamin D. Check the label on foods and aim for 100% of the RDA (recommended daily allowance).  Lifestyle  Exercise at least 150 minutes each week  (30 minutes a day,  5 days a week).  Do muscle strengthening activities 2 days a week. These help control your weight and prevent disease.  No smoking.  Wear sunscreen to prevent skin cancer.  Have a dental exam and cleaning every 6 months.  Yearly exams  See your health care team every year to talk about:  Any changes in your health.  Any medicines your care team has prescribed.  Preventive care, family planning, and ways to prevent chronic diseases.  Shots (vaccines)   HPV shots (up to age 26), if you've never had them before.  Hepatitis B shots (up to age 59), if you've never had them before.  COVID-19 shot: Get this shot when it's due.  Flu shot: Get a flu shot every year.  Tetanus shot: Get a tetanus shot every 10 years.  Pneumococcal, hepatitis A, and RSV shots: Ask your care team if you need these based on your risk.  Shingles shot (for age 50 and up)  General health tests  Diabetes screening:  Starting at age 35, Get screened for diabetes at least every 3 years.  If you are younger than age 35, ask your care team if you should be screened for diabetes.  Cholesterol test: At age 39, start having a cholesterol test every 5 years, or more often if advised.  Bone density scan (DEXA): At age 50, ask your care team if you should have this scan for osteoporosis (brittle bones).  Hepatitis C: Get tested at least once in your life.  STIs (sexually transmitted infections)  Before age 24: Ask your care team if you should be screened for STIs.  After age 24: Get screened for STIs if you're at risk. You are at risk for STIs (including HIV) if:  You are sexually active with more than one person.  You don't use condoms every time.  You or a partner was diagnosed with a sexually transmitted infection.  If you are at risk for HIV, ask about PrEP medicine to prevent HIV.  Get tested for HIV at least once in your life, whether you are at risk for HIV or not.  Cancer screening tests  Cervical cancer screening: If you have a cervix, begin getting  regular cervical cancer screening tests starting at age 21.  Breast cancer scan (mammogram): If you've ever had breasts, begin having regular mammograms starting at age 40. This is a scan to check for breast cancer.  Colon cancer screening: It is important to start screening for colon cancer at age 45.  Have a colonoscopy test every 10 years (or more often if you're at risk) Or, ask your provider about stool tests like a FIT test every year or Cologuard test every 3 years.  To learn more about your testing options, visit:   .  For help making a decision, visit:   https://bit.ly/nd38323.  Prostate cancer screening test: If you have a prostate, ask your care team if a prostate cancer screening test (PSA) at age 55 is right for you.  Lung cancer screening: If you are a current or former smoker ages 50 to 80, ask your care team if ongoing lung cancer screenings are right for you.  For informational purposes only. Not to replace the advice of your health care provider. Copyright   2023 [employer]. All rights reserved. Clinically reviewed by the [facility] Transitions Program. [employer] [id number] - REV 01/24.

## 2025-01-16 LAB
ALBUMIN SERPL BCG-MCNC: 4.3 G/DL (ref 3.5–5.2)
ALP SERPL-CCNC: 52 U/L (ref 40–150)
ALT SERPL W P-5'-P-CCNC: 21 U/L (ref 0–70)
ANION GAP SERPL CALCULATED.3IONS-SCNC: 11 MMOL/L (ref 7–15)
AST SERPL W P-5'-P-CCNC: 23 U/L (ref 0–45)
BILIRUB SERPL-MCNC: 0.4 MG/DL
BUN SERPL-MCNC: 28.2 MG/DL (ref 6–20)
CALCIUM SERPL-MCNC: 9.8 MG/DL (ref 8.8–10.4)
CHLORIDE SERPL-SCNC: 103 MMOL/L (ref 98–107)
CHOLEST SERPL-MCNC: 207 MG/DL
CREAT SERPL-MCNC: 1.29 MG/DL (ref 0.67–1.17)
EGFRCR SERPLBLD CKD-EPI 2021: 65 ML/MIN/1.73M2
FASTING STATUS PATIENT QL REPORTED: YES
FASTING STATUS PATIENT QL REPORTED: YES
GLUCOSE SERPL-MCNC: 90 MG/DL (ref 70–99)
HCO3 SERPL-SCNC: 26 MMOL/L (ref 22–29)
HDLC SERPL-MCNC: 65 MG/DL
LDLC SERPL CALC-MCNC: 126 MG/DL
NONHDLC SERPL-MCNC: 142 MG/DL
POTASSIUM SERPL-SCNC: 4.1 MMOL/L (ref 3.4–5.3)
PROT SERPL-MCNC: 6.5 G/DL (ref 6.4–8.3)
PSA SERPL DL<=0.01 NG/ML-MCNC: 0.19 NG/ML (ref 0–3.5)
SODIUM SERPL-SCNC: 140 MMOL/L (ref 135–145)
TRIGL SERPL-MCNC: 81 MG/DL

## 2025-01-16 RX ORDER — SILDENAFIL 100 MG/1
100 TABLET, FILM COATED ORAL DAILY PRN
Qty: 30 TABLET | Refills: 3 | Status: SHIPPED | OUTPATIENT
Start: 2025-01-16

## 2025-01-16 NOTE — RESULT ENCOUNTER NOTE
"Sandro Burgos,    If you have not viewed these results on [email or web address] within 3 days, we will use an alternative method to contact you. We will contact you via the following protocol:    - Via letter if your results are normal.  - Via phone ([phone]) if your results are abnormal.     Here are my comments about your recent results:    Lipids - Your \"bad\" cholesterol was elevated. This can be improved with diet and exercise. All animal based foods such as meat, dairy, and eggs contain cholesterol. All plant based foods such as fruits, nuts, and vegetables do not.    You do not need a medication for this at this time.    PSA - Your Prostate cancer screening lab results were normal.    CMP - Your blood sugar was 90. Your electrolytes, and liver function were normal.     Your kidney function was slightly lower. This is often due to dehydration. While I am not immediately concerned at this time, I would like you to have this rechecked with a \"lab only visit\" on a day you are well hydrated. You do not need to be fasting that day. Please complete this within 1 month.    Please call the clinic ([phone]), or message us on [employer] with any questions you may have.     Have a great day,    Dr. Weber    The 10-year ASCVD risk score (Gardenia MARIEE, et al., 2019) is: 5.8%    Values used to calculate the score:      Age: 54 years      Sex: Male      Is Non- : No      Diabetic: No      Tobacco smoker: No      Systolic Blood Pressure: 126 mmHg      Is BP treated: Yes      HDL Cholesterol: 65 mg/dL      Total Cholesterol: 207 mg/dL"

## 2025-07-28 ENCOUNTER — MYC MEDICAL ADVICE (OUTPATIENT)
Dept: FAMILY MEDICINE | Facility: CLINIC | Age: 55
End: 2025-07-28
Payer: COMMERCIAL

## (undated) DEVICE — PAD CHUX UNDERPAD 23X24" 7136

## (undated) DEVICE — SUCTION MANIFOLD DORNOCH ULTRA CART UL-CL500

## (undated) DEVICE — ENDO TUBING CO2 SMARTCAP STERILE DISP 100145CO2EXT

## (undated) DEVICE — PREP CHLORAPREP 26ML TINTED ORANGE  260815

## (undated) DEVICE — KIT ENDO FIRST STEP DISINFECTANT 200ML W/POUCH EP-4

## (undated) DEVICE — PACK ENDOSCOPY GI CUSTOM UMMC

## (undated) DEVICE — ENDO CAP AND TUBING STERILE FOR ENDOGATOR  100130

## (undated) DEVICE — TAPE CLOTH 3" CARDINAL 3TRCL03

## (undated) DEVICE — SOL WATER IRRIG 1000ML BOTTLE 2F7114

## (undated) DEVICE — WIPE PREMOIST CLEANSING WASHCLOTHS 7988

## (undated) DEVICE — KIT CONNECTOR FOR OLYMPUS ENDOSCOPES DEFENDO 100310

## (undated) RX ORDER — FENTANYL CITRATE 50 UG/ML
INJECTION, SOLUTION INTRAMUSCULAR; INTRAVENOUS
Status: DISPENSED
Start: 2019-04-08

## (undated) RX ORDER — SIMETHICONE 40MG/0.6ML
SUSPENSION, DROPS(FINAL DOSAGE FORM)(ML) ORAL
Status: DISPENSED
Start: 2019-04-08

## (undated) RX ORDER — FENTANYL CITRATE 50 UG/ML
INJECTION, SOLUTION INTRAMUSCULAR; INTRAVENOUS
Status: DISPENSED
Start: 2024-03-25